# Patient Record
Sex: FEMALE | Race: OTHER | Employment: OTHER | URBAN - NONMETROPOLITAN AREA
[De-identification: names, ages, dates, MRNs, and addresses within clinical notes are randomized per-mention and may not be internally consistent; named-entity substitution may affect disease eponyms.]

---

## 2018-10-19 ENCOUNTER — HOSPITAL ENCOUNTER (EMERGENCY)
Age: 48
Discharge: HOME | End: 2018-10-19
Attending: EMERGENCY MEDICINE
Payer: MEDICARE

## 2018-10-19 VITALS
DIASTOLIC BLOOD PRESSURE: 82 MMHG | TEMPERATURE: 98.8 F | OXYGEN SATURATION: 97 % | RESPIRATION RATE: 20 BRPM | HEART RATE: 99 BPM | BODY MASS INDEX: 19.04 KG/M2 | SYSTOLIC BLOOD PRESSURE: 137 MMHG | WEIGHT: 97 LBS | HEIGHT: 60 IN

## 2018-10-19 DIAGNOSIS — N30.00 ACUTE CYSTITIS WITHOUT HEMATURIA: Primary | ICD-10-CM

## 2018-10-19 DIAGNOSIS — J45.909 ASTHMA: ICD-10-CM

## 2018-10-19 LAB
BACTERIA: ABNORMAL /HPF
BILIRUBIN URINE: NEGATIVE
BLOOD, URINE: ABNORMAL
CLARITY: ABNORMAL
COLOR: YELLOW
EPITHELIAL CELLS, UA: ABNORMAL /HPF
GLUCOSE URINE: NEGATIVE MG/DL
HCG(URINE) PREGNANCY TEST: NEGATIVE
KETONES, URINE: NEGATIVE MG/DL
LEUKOCYTE ESTERASE, URINE: ABNORMAL
MICROSCOPIC EXAMINATION: YES
NITRITE, URINE: POSITIVE
PH UA: 7.5
PROTEIN UA: 100 MG/DL
RBC UA: ABNORMAL /HPF (ref 0–2)
SPECIFIC GRAVITY UA: 1.02
URINE REFLEX TO CULTURE: YES
URINE TYPE: ABNORMAL
UROBILINOGEN, URINE: 0.2 E.U./DL
WBC UA: >100 /HPF (ref 0–5)

## 2018-10-19 PROCEDURE — 87077 CULTURE AEROBIC IDENTIFY: CPT

## 2018-10-19 PROCEDURE — 87186 SC STD MICRODIL/AGAR DIL: CPT

## 2018-10-19 PROCEDURE — 81001 URINALYSIS AUTO W/SCOPE: CPT

## 2018-10-19 PROCEDURE — 84703 CHORIONIC GONADOTROPIN ASSAY: CPT

## 2018-10-19 PROCEDURE — 99284 EMERGENCY DEPT VISIT MOD MDM: CPT

## 2018-10-19 PROCEDURE — 6370000000 HC RX 637 (ALT 250 FOR IP): Performed by: EMERGENCY MEDICINE

## 2018-10-19 PROCEDURE — 87086 URINE CULTURE/COLONY COUNT: CPT

## 2018-10-19 RX ORDER — CIPROFLOXACIN 500 MG/1
500 TABLET, FILM COATED ORAL ONCE
Status: CANCELLED | OUTPATIENT
Start: 2018-10-19 | End: 2018-10-19

## 2018-10-19 RX ORDER — ONDANSETRON 4 MG/1
4 TABLET, ORALLY DISINTEGRATING ORAL EVERY 8 HOURS PRN
Qty: 10 TABLET | Refills: 0 | Status: ON HOLD | OUTPATIENT
Start: 2018-10-19 | End: 2019-06-23

## 2018-10-19 RX ORDER — CIPROFLOXACIN 500 MG/1
500 TABLET, FILM COATED ORAL ONCE
Status: COMPLETED | OUTPATIENT
Start: 2018-10-19 | End: 2018-10-19

## 2018-10-19 RX ORDER — CIPROFLOXACIN 500 MG/1
500 TABLET, FILM COATED ORAL 2 TIMES DAILY
Qty: 20 TABLET | Refills: 0 | Status: SHIPPED | OUTPATIENT
Start: 2018-10-19 | End: 2018-10-29

## 2018-10-19 RX ORDER — ALBUTEROL SULFATE 90 UG/1
2 AEROSOL, METERED RESPIRATORY (INHALATION) EVERY 6 HOURS PRN
Qty: 1 INHALER | Refills: 0 | Status: SHIPPED | OUTPATIENT
Start: 2018-10-19 | End: 2018-10-19

## 2018-10-19 RX ADMIN — CIPROFLOXACIN 500 MG: 500 TABLET, FILM COATED ORAL at 19:22

## 2018-10-19 ASSESSMENT — PAIN DESCRIPTION - PROGRESSION: CLINICAL_PROGRESSION: GRADUALLY WORSENING

## 2018-10-19 ASSESSMENT — PAIN DESCRIPTION - LOCATION: LOCATION: BACK

## 2018-10-19 ASSESSMENT — PAIN DESCRIPTION - PAIN TYPE: TYPE: ACUTE PAIN

## 2018-10-19 ASSESSMENT — PAIN DESCRIPTION - FREQUENCY: FREQUENCY: CONTINUOUS

## 2018-10-19 ASSESSMENT — PAIN DESCRIPTION - DESCRIPTORS: DESCRIPTORS: BURNING

## 2018-10-19 ASSESSMENT — PAIN SCALES - GENERAL: PAINLEVEL_OUTOF10: 9

## 2018-10-19 ASSESSMENT — PAIN DESCRIPTION - ONSET: ONSET: SUDDEN

## 2018-10-19 ASSESSMENT — PAIN DESCRIPTION - ORIENTATION: ORIENTATION: RIGHT;LOWER

## 2018-10-22 LAB
ORGANISM: ABNORMAL
URINE CULTURE, ROUTINE: ABNORMAL
URINE CULTURE, ROUTINE: ABNORMAL

## 2019-01-17 ENCOUNTER — HOSPITAL ENCOUNTER (EMERGENCY)
Age: 49
Discharge: HOME | End: 2019-01-17
Attending: EMERGENCY MEDICINE
Payer: MEDICARE

## 2019-01-17 ENCOUNTER — APPOINTMENT (OUTPATIENT)
Dept: CT IMAGING | Age: 49
End: 2019-01-17
Payer: MEDICARE

## 2019-01-17 VITALS
HEIGHT: 60 IN | BODY MASS INDEX: 21.6 KG/M2 | DIASTOLIC BLOOD PRESSURE: 86 MMHG | HEART RATE: 95 BPM | TEMPERATURE: 99 F | OXYGEN SATURATION: 98 % | SYSTOLIC BLOOD PRESSURE: 133 MMHG | RESPIRATION RATE: 16 BRPM | WEIGHT: 110 LBS

## 2019-01-17 DIAGNOSIS — S09.90XA CLOSED HEAD INJURY, INITIAL ENCOUNTER: Primary | ICD-10-CM

## 2019-01-17 PROCEDURE — 6370000000 HC RX 637 (ALT 250 FOR IP): Performed by: EMERGENCY MEDICINE

## 2019-01-17 PROCEDURE — 70450 CT HEAD/BRAIN W/O DYE: CPT

## 2019-01-17 PROCEDURE — 99283 EMERGENCY DEPT VISIT LOW MDM: CPT

## 2019-01-17 RX ORDER — IBUPROFEN 600 MG/1
600 TABLET ORAL ONCE
Status: COMPLETED | OUTPATIENT
Start: 2019-01-17 | End: 2019-01-17

## 2019-01-17 RX ORDER — OMEPRAZOLE 20 MG/1
20 CAPSULE, DELAYED RELEASE ORAL DAILY
COMMUNITY
Start: 2018-04-20 | End: 2021-11-16

## 2019-01-17 RX ORDER — FLUTICASONE PROPIONATE 50 MCG
SPRAY, SUSPENSION (ML) NASAL DAILY PRN
COMMUNITY
Start: 2018-10-19

## 2019-01-17 RX ORDER — IBUPROFEN 600 MG/1
600 TABLET ORAL EVERY 6 HOURS PRN
Qty: 20 TABLET | Refills: 0 | Status: SHIPPED | OUTPATIENT
Start: 2019-01-17 | End: 2019-05-03

## 2019-01-17 RX ADMIN — IBUPROFEN 600 MG: 600 TABLET ORAL at 12:21

## 2019-01-17 ASSESSMENT — PAIN SCALES - GENERAL
PAINLEVEL_OUTOF10: 9
PAINLEVEL_OUTOF10: 5

## 2019-01-17 ASSESSMENT — PAIN DESCRIPTION - LOCATION: LOCATION: HEAD

## 2019-01-17 ASSESSMENT — PAIN DESCRIPTION - FREQUENCY: FREQUENCY: OTHER (COMMENT)

## 2019-01-17 ASSESSMENT — PAIN DESCRIPTION - PAIN TYPE: TYPE: ACUTE PAIN

## 2019-05-03 ENCOUNTER — HOSPITAL ENCOUNTER (EMERGENCY)
Age: 49
Discharge: HOME | End: 2019-05-03
Attending: EMERGENCY MEDICINE
Payer: MEDICARE

## 2019-05-03 ENCOUNTER — APPOINTMENT (OUTPATIENT)
Dept: GENERAL RADIOLOGY | Age: 49
End: 2019-05-03
Payer: MEDICARE

## 2019-05-03 VITALS
TEMPERATURE: 99.5 F | HEART RATE: 97 BPM | SYSTOLIC BLOOD PRESSURE: 122 MMHG | WEIGHT: 97 LBS | OXYGEN SATURATION: 96 % | RESPIRATION RATE: 16 BRPM | BODY MASS INDEX: 19.04 KG/M2 | HEIGHT: 60 IN | DIASTOLIC BLOOD PRESSURE: 84 MMHG

## 2019-05-03 DIAGNOSIS — S82.401A CLOSED FRACTURE OF RIGHT TIBIA AND FIBULA, INITIAL ENCOUNTER: Primary | ICD-10-CM

## 2019-05-03 DIAGNOSIS — S82.201A CLOSED FRACTURE OF RIGHT TIBIA AND FIBULA, INITIAL ENCOUNTER: Primary | ICD-10-CM

## 2019-05-03 PROCEDURE — 99283 EMERGENCY DEPT VISIT LOW MDM: CPT

## 2019-05-03 PROCEDURE — 73600 X-RAY EXAM OF ANKLE: CPT

## 2019-05-03 PROCEDURE — 4500000023 HC ED LEVEL 3 PROCEDURE

## 2019-05-03 PROCEDURE — 6370000000 HC RX 637 (ALT 250 FOR IP): Performed by: EMERGENCY MEDICINE

## 2019-05-03 RX ORDER — OXYCODONE HYDROCHLORIDE AND ACETAMINOPHEN 5; 325 MG/1; MG/1
1 TABLET ORAL ONCE
Status: COMPLETED | OUTPATIENT
Start: 2019-05-03 | End: 2019-05-03

## 2019-05-03 RX ORDER — OXYCODONE HYDROCHLORIDE AND ACETAMINOPHEN 5; 325 MG/1; MG/1
1 TABLET ORAL EVERY 6 HOURS PRN
Qty: 12 TABLET | Refills: 0 | Status: SHIPPED | OUTPATIENT
Start: 2019-05-03 | End: 2019-05-06

## 2019-05-03 RX ADMIN — OXYCODONE AND ACETAMINOPHEN 1 TABLET: 5; 325 TABLET ORAL at 22:42

## 2019-05-03 ASSESSMENT — PAIN DESCRIPTION - FREQUENCY: FREQUENCY: CONTINUOUS

## 2019-05-03 ASSESSMENT — PAIN DESCRIPTION - ORIENTATION: ORIENTATION: RIGHT

## 2019-05-03 ASSESSMENT — PAIN SCALES - GENERAL: PAINLEVEL_OUTOF10: 10

## 2019-05-03 ASSESSMENT — PAIN DESCRIPTION - LOCATION: LOCATION: ANKLE

## 2019-05-03 ASSESSMENT — PAIN DESCRIPTION - PAIN TYPE: TYPE: ACUTE PAIN

## 2019-05-03 ASSESSMENT — PAIN DESCRIPTION - DESCRIPTORS: DESCRIPTORS: DULL;CONSTANT

## 2019-05-04 NOTE — ED NOTES
Pt DC home in good condition with RX x 1. V/u of Dc instructions. Denies questions or concerns. Teaching done re: s/s to report.      Holley Mccray RN  05/03/19 2446

## 2019-05-04 NOTE — ED PROVIDER NOTES
Emergency Department Attending Note    Alia Kimbrough MD    Date of ED VIsit: 5/3/2019    CHIEF COMPLAINT  Ankle Pain (pt states she has hx of dizzy spells and falls. Happened again earlier today and hit rt ankle on something hard this time, is painful and swelling, States dizziness and weakness is from anemia. States has an order to see another gi dr. to have stomach reevaluated.)      HISTORY OF PRESENT ILLNESS  Marcelo Antunez is a 54 y.o. female  With Vital signs of /84   Pulse 97   Temp 99.5 °F (37.5 °C) (Oral)   Resp 16   Ht 5' (1.524 m)   Wt 97 lb (44 kg)   SpO2 96%   BMI 18.94 kg/m²  who presents to the ED with a complaint of right ankle pain and swelling. . Patient seen and evaluated in room 8. Patient states she was walking in her bedroom when she had a little lightheaded and apparently hit her right ankle on the lateral aspect on a firm's piece of furniture causing exquisite pain in the lateral aspect of the right ankle. She did not syncopized she was able to sit down but it got swollen pretty quickly so she opted to come to the emergency for evaluation. She has no other sites of injury from the fall. She does have a chronic pain issue with arthritis as the lead diagnosis. She is able to wiggle her toes. No other complaints, modifying factors or associated symptoms. I have reviewed the following from the nursing documentation. Past Medical History:   Diagnosis Date    ADHD     Allergic rhinitis     Anemia     Anxiety     Arthritis     Asthma     Bipolar 1 disorder (HCC)     Chicken pox     as a child    COPD (chronic obstructive pulmonary disease) ([facility] [address]     Depression     GERD (gastroesophageal reflux disease)     PTSD (post-traumatic stress disorder)      Past Surgical History:   Procedure Laterality Date    APPENDECTOMY       SECTION      TONSILLECTOMY       No family history on file.   Social History     Socioeconomic History    Marital status: Legally      Spouse name: Not on file    Number of children: Not on file    Years of education: Not on file    Highest education level: Not on file   Occupational History    Not on file   Social Needs    Financial resource strain: Not on file    Food insecurity:     Worry: Not on file     Inability: Not on file    Transportation needs:     Medical: Not on file     Non-medical: Not on file   Tobacco Use    Smoking status: Never Smoker    Smokeless tobacco: Former User     Types: Chew   Substance and Sexual Activity    Alcohol use: Yes     Comment: occ    Drug use: Yes     Types: Marijuana     Comment: occ    Sexual activity: Yes     Partners: Male   Lifestyle    Physical activity:     Days per week: Not on file     Minutes per session: Not on file    Stress: Not on file   Relationships    Social connections:     Talks on phone: Not on file     Gets together: Not on file     Attends Judaism service: Not on file     Active member of club or organization: Not on file     Attends meetings of clubs or organizations: Not on file     Relationship status: Not on file    Intimate partner violence:     Fear of current or ex partner: Not on file     Emotionally abused: Not on file     Physically abused: Not on file     Forced sexual activity: Not on file   Other Topics Concern    Not on file   Social History Narrative    Not on file     Current Facility-Administered Medications   Medication Dose Route Frequency Provider Last Rate Last Dose    oxyCODONE-acetaminophen (PERCOCET) 5-325 MG per tablet 1 tablet  1 tablet Oral Once Ana Ochoa MD         Current Outpatient Medications   Medication Sig Dispense Refill    omeprazole (PRILOSEC) 20 MG delayed release capsule Take 20 mg by mouth daily      fluticasone (FLONASE) 50 MCG/ACT nasal spray       Meclizine HCl (ANTIVERT PO) Take 12.5 mg by mouth 3 times daily as needed      ondansetron (ZOFRAN ODT) 4 MG disintegrating tablet Take 1 tablet by mouth every 8 hours as needed for Nausea or Vomiting (You feel nauseous) 10 tablet 0    celecoxib (CELEBREX) 100 MG capsule Take 100 mg by mouth 2 times daily Unsure of dose      traMADol (ULTRAM) 50 MG tablet Take 100 mg by mouth 3 times daily      cyclobenzaprine (FLEXERIL) 10 MG tablet Take 10 mg by mouth 3 times daily as needed for Muscle spasms      calcium carbonate (OSCAL) 500 MG TABS tablet Take 1,000 mg by mouth daily      albuterol (PROVENTIL HFA;VENTOLIN HFA) 108 (90 BASE) MCG/ACT inhaler Inhale 2 puffs into the lungs every 6 hours as needed for Wheezing      docusate sodium (COLACE) 100 MG capsule Take 100 mg by mouth 2 times daily      Ferrous Sulfate (IRON) 325 (65 FE) MG TABS Take 325 mg by mouth 2 times daily. 60 tablet 2    VITAMIN D PO Take  by mouth.  Polyethylene Glycol 3350 (MIRALAX PO) Take  by mouth. No Known Allergies    REVIEW OF SYSTEMS  10 systems reviewed, pertinent positives per HPI otherwise noted to be negative     PHYSICAL EXAM  /84   Pulse 97   Temp 99.5 °F (37.5 °C) (Oral)   Resp 16   Ht 5' (1.524 m)   Wt 97 lb (44 kg)   SpO2 96%   BMI 18.94 kg/m²   GENERAL APPEARANCE: Awake and alert. Cooperative. In mild to moderate distress. HEAD: Normocephalic. Atraumatic. EYES: PERRL. EOM's grossly intact. ENT: Mucous membranes are pink and moist.   NECK: Supple. HEART: RRR. No murmurs. LUNGS: Respirations unlabored. CTAB. Good air exchange. ABDOMEN: Soft. Non-distended. Non-tender. No masses. No organomegaly. No guarding or rebound. EXTREMITIES: No peripheral edema. Moves all extremities equally. All extremities neurovascularly intact. There is obvious swelling over the lateral malleolus of the right foot ankle  SKIN: Warm and dry. No acute rashes. NEUROLOGICAL: Alert and oriented. Is able to wiggle her toes on the right foot. Strength 5/5, sensation intact. Gait normal.   PSYCHIATRIC: Normal mood and affect.   No HI or SI expressed to me.    RADIOLOGY    See below     EKG:     See below      ED COURSE/MDM        ED Course as of May 03 2250   Fri May 03, 2019   2249 4 patient has a compounded fractured distal fibula only. Tibia is in place without fracture. XR ANKLE RIGHT (2 VIEWS) [DL]   2206 She will be placed in a splint as well as given crutches for walking to follow-up with an orthopedic surgeon    [DL]   2249 Pain medicine and enough to get her through until Monday until she can talk to her primary management clinic. [DL]      ED Course User Index  [DL] Lewis Hazel MD       Radiographs show a fibular distal fibular fracture. No tibial fractures noted. She'll be placed in a splint given some crutches to help ambulate and follow-up with an orthopedic surgeon for definitive management. She'll be given only 12 Percocet to get her through until Monday when she can call her primary pain management clinic for additional pain management. Old records were reviewed when applicable.  The ED course and plan were reviewed and results discussed with the patient    CLINICAL IMPRESSION and DISPOSITION  Maria A Wu was stable and diagnosed with ankle fracture    Patient was treated with  Percocet splint and crutches         Lewis Hazel MD  05/03/19 4500

## 2019-05-07 ENCOUNTER — OFFICE VISIT (OUTPATIENT)
Dept: ORTHOPEDIC SURGERY | Age: 49
End: 2019-05-07
Payer: MEDICARE

## 2019-05-07 VITALS
SYSTOLIC BLOOD PRESSURE: 132 MMHG | HEIGHT: 60 IN | BODY MASS INDEX: 19.04 KG/M2 | HEART RATE: 74 BPM | WEIGHT: 97 LBS | DIASTOLIC BLOOD PRESSURE: 80 MMHG

## 2019-05-07 DIAGNOSIS — S82.831A CLOSED FRACTURE OF DISTAL END OF RIGHT FIBULA, UNSPECIFIED FRACTURE MORPHOLOGY, INITIAL ENCOUNTER: Primary | ICD-10-CM

## 2019-05-07 PROCEDURE — 99203 OFFICE O/P NEW LOW 30 MIN: CPT | Performed by: ORTHOPAEDIC SURGERY

## 2019-05-07 PROCEDURE — G8420 CALC BMI NORM PARAMETERS: HCPCS | Performed by: ORTHOPAEDIC SURGERY

## 2019-05-07 PROCEDURE — G8427 DOCREV CUR MEDS BY ELIG CLIN: HCPCS | Performed by: ORTHOPAEDIC SURGERY

## 2019-05-07 PROCEDURE — 1036F TOBACCO NON-USER: CPT | Performed by: ORTHOPAEDIC SURGERY

## 2019-05-07 RX ORDER — CEPHALEXIN 500 MG/1
500 CAPSULE ORAL 4 TIMES DAILY
Qty: 8 CAPSULE | Refills: 0 | Status: SHIPPED | OUTPATIENT
Start: 2019-05-07 | End: 2019-05-09

## 2019-05-07 RX ORDER — OXYCODONE HYDROCHLORIDE AND ACETAMINOPHEN 5; 325 MG/1; MG/1
1 TABLET ORAL EVERY 8 HOURS PRN
Qty: 21 TABLET | Refills: 0 | Status: SHIPPED | OUTPATIENT
Start: 2019-05-10 | End: 2019-05-16

## 2019-05-07 NOTE — LETTER
Surgery Scheduling Form for [facility]   FAX# [phone]    Adrianna Echevarria MD    Surgical Procedure: ORIF RIGHT FIBULA FRACTURE / [id number]    Scheduled Date: 19     Scheduled Time: 12:15 PM    Length of Surgery: 60 minutes    Patient Information:   Name: Anni Alva    YOB: 1970 Gender: female    SSN:     Address: [address] [place] [address]     Phone number: [phone] (home)     Primary Care Physician: Zulay Cortez PA-C    Classification:  [x]Same Day   [] Admit Same Day  [] Already Inpatient    Pre- Op Diagnosis:  RIGHT FIBULA FRACTURE - S82.831A    Anesthesia Type:   [x]General []MAC    []IV Regional []Local [x]Other: BLOCK               SCD:  [x]Knee High []Thigh High    Allergies: No Known Allergies  Latex: []YES          [x]NO    []C-ARM needed [x]Special Equipment: Roberto Locust      []Rep Notified      [x]   Mini C-ARM  []  None Needed         Insurance Information: MEDICARE  [x]Card in Media in EPIC Chart    []Card Faxed    Special Requests:     Remarks/Comments:  PCP TO DO THE H & P    Scheduled By: Justin Dexter 2019                          [facility]    IN ACCORDANCE WITH OUR FORMULARY SYSTEM, A GENERIC EQUIVALENT DRUG MAY BE DISPENSED AND ADMINISTERED UNLESS D. A. W. IS WRITTEN WITH THE MEDICATION ORDER  PRE-SURGICAL PHYSICIAN ORDERS  ORTHOPEDIC SURGERY    Patient Name Anni Alva                              1970     Surgical Procedure ORIF RIGHT FIBULA FRACTURE / [id number]    Date of Surgery 19     []IAdmit as Inpatient    [x]I Outpatient  [] Already Inpatient    Height 4 FT 11 IN     Weight 97 LBS    Allergies  No Known Allergies  MRSA positive or history:     Pre-surgery Testing Orders:   [x]I Pre-surgical Anesthesia Orders Per Anesthesiologist  Additional Testing:    []IU/A               []I Urine C/S                             []I CBC w Diff                             []I Type & Screen                             []I PTINR []I PTT           []I Transferrin         []I Albumin    []I BMP   []I Other  []I CXR (medical reason)        Preop Antibiotic Prophylaxis / DAY OF SURGERY 19     [x]I Cefazolin IVPB per weight base protocol  ? Cefazolin 2 grams if <119.9 kg  ? Cefazolin 3 grams if ?120kg (?264 lbs. )  ? Pediatric(<14yo) Dosinmg/kg (maximum 2 grams)   If allergic to PCN    []I  Clindamycin 900 mg IVPB   ___________________________________________  If allergic to PCN/Cephalosporin, positive MRSA/history or ? 72  age (risk of C-difficile):   []I  Vancomycin IVPB per weight base protocol  ? Vancomycin 1 gm if < 80kg (<176 lbs. )  ? Vancomycin 1.5 gm if ?80kg to <120kg (?176 to <264 lbs. )  ? Vancomycin 2 gm if  if ?120kg (?264 lbs.)   ADDITIONAL MEDICATIONS:      []I OFIRMEV IVPB 1gm over 15 minutes (Adjust to body weight when needed.    DVT PREVENTION:     [x]I  Knee high Antithrombic wraps (Age 16 & older)        []I  Bilateral             []I   Right                 [x]I Left   []I  Thigh MARLEN Hose          Signature                                  Olga Townsend MD           Date   19    4:24 PM  Please fax at time of booking the case to the Scheduling office at  [facility] at [phone]                                                  Updated 2015

## 2019-05-08 ENCOUNTER — TELEPHONE (OUTPATIENT)
Dept: ORTHOPEDIC SURGERY | Age: 49
End: 2019-05-08

## 2019-05-16 ENCOUNTER — HOSPITAL ENCOUNTER (EMERGENCY)
Age: 49
Discharge: HOME | End: 2019-05-16
Payer: MEDICARE

## 2019-05-16 ENCOUNTER — APPOINTMENT (OUTPATIENT)
Dept: GENERAL RADIOLOGY | Age: 49
End: 2019-05-16
Payer: MEDICARE

## 2019-05-16 VITALS
HEART RATE: 94 BPM | TEMPERATURE: 98.8 F | WEIGHT: 97 LBS | DIASTOLIC BLOOD PRESSURE: 64 MMHG | SYSTOLIC BLOOD PRESSURE: 125 MMHG | BODY MASS INDEX: 19.04 KG/M2 | HEIGHT: 60 IN | RESPIRATION RATE: 16 BRPM | OXYGEN SATURATION: 99 %

## 2019-05-16 DIAGNOSIS — S82.401D: Primary | ICD-10-CM

## 2019-05-16 PROCEDURE — 99283 EMERGENCY DEPT VISIT LOW MDM: CPT

## 2019-05-16 PROCEDURE — 73610 X-RAY EXAM OF ANKLE: CPT

## 2019-05-16 PROCEDURE — 73590 X-RAY EXAM OF LOWER LEG: CPT

## 2019-05-16 RX ORDER — OXYCODONE HYDROCHLORIDE AND ACETAMINOPHEN 5; 325 MG/1; MG/1
1 TABLET ORAL EVERY 6 HOURS PRN
Qty: 10 TABLET | Refills: 0 | Status: SHIPPED | OUTPATIENT
Start: 2019-05-16 | End: 2019-05-19

## 2019-05-16 ASSESSMENT — PAIN DESCRIPTION - PAIN TYPE: TYPE: ACUTE PAIN

## 2019-05-16 ASSESSMENT — PAIN SCALES - GENERAL: PAINLEVEL_OUTOF10: 9

## 2019-05-16 ASSESSMENT — PAIN DESCRIPTION - ORIENTATION: ORIENTATION: RIGHT

## 2019-05-16 ASSESSMENT — PAIN DESCRIPTION - LOCATION: LOCATION: LEG

## 2019-05-16 NOTE — ED PROVIDER NOTES
Evaluated by BIANCA supervising physician available for consult    Patient fell and fractured distal fibula 5/3 seen in ER, then saw orthopedics  and plan for surgery  but patient didn't have a ride and rescheduled for . She is wearing a cast and using crutches but states she has slipped a few times and hit her foot on the ground and thinks she has worsened the fracture and came in requesting another xray and more pain medication. The history is provided by the patient. Ankle Problem   Location:  Ankle  Time since incident:  1 week  Injury: yes    Ankle location:  R ankle  Pain details:     Progression:  Worsening  Chronicity:  New  Associated symptoms: swelling    Associated symptoms: no fever        Review of Systems   Constitutional: Negative for fever. Musculoskeletal:        Right ankle pain   Skin: Negative for wound. Neurological: Negative for numbness. PAST MEDICAL HISTORY   has a past medical history of ADHD, Allergic rhinitis, Anemia, Anxiety, Arthritis, Asthma, Bipolar 1 disorder ([address] Chicken pox, COPD (chronic obstructive pulmonary disease) ([facility] [address] Depression, GERD (gastroesophageal reflux disease), and PTSD (post-traumatic stress disorder). PAST SURGICAL HISTORY   has a past surgical history that includes Appendectomy; Tonsillectomy; and  section. FAMILY HISTORY  family history is not on file. She was adopted. SOCIAL HISTORY   reports that she has never smoked. She has quit using smokeless tobacco. Her smokeless tobacco use included chew. She reports that she drank alcohol. She reports that she has current or past drug history. Drug: Marijuana. HOME MEDICATIONS     Prior to Admission medications    Medication Sig Start Date End Date Taking? Authorizing Provider   oxyCODONE-acetaminophen (PERCOCET) 5-325 MG per tablet Take 1 tablet by mouth every 8 hours as needed for Pain for up to 7 days.  5/10/19 5/17/19 Yes Tay Gramajo MD   omeprazole (PRILOSEC) 20 MG delayed release capsule Take 20 mg by mouth daily 4/20/18  Yes Historical Provider, MD   fluticasone (FLONASE) 50 MCG/ACT nasal spray by Nasal route daily as needed  10/19/18  Yes Historical Provider, MD   Meclizine HCl (ANTIVERT PO) Take 12.5 mg by mouth 3 times daily as needed 2/28/14  Yes Historical Provider, MD   ondansetron (ZOFRAN ODT) 4 MG disintegrating tablet Take 1 tablet by mouth every 8 hours as needed for Nausea or Vomiting (You feel nauseous) 10/19/18  Yes Eze Mix MD   celecoxib (CELEBREX) 100 MG capsule Take 100 mg by mouth 2 times daily Unsure of dose   Yes Historical Provider, MD   cyclobenzaprine (FLEXERIL) 10 MG tablet Take 10 mg by mouth 3 times daily as needed for Muscle spasms   Yes Historical Provider, MD   calcium carbonate (OSCAL) 500 MG TABS tablet Take 1,000 mg by mouth daily   Yes Historical Provider, MD   albuterol (PROVENTIL HFA;VENTOLIN HFA) 108 (90 BASE) MCG/ACT inhaler Inhale 2 puffs into the lungs every 6 hours as needed for Wheezing   Yes Historical Provider, MD   docusate sodium (COLACE) 100 MG capsule Take 100 mg by mouth 2 times daily as needed    Yes Historical Provider, MD   Ferrous Sulfate (IRON) 325 (65 FE) MG TABS Take 325 mg by mouth 2 times daily. 2/28/11  Yes Christo Puckett MD        ALLERGIES  has No Known Allergies. /64   Pulse 94   Temp 98.8 °F (37.1 °C) (Oral)   Resp 16   Ht 5' (1.524 m)   Wt 97 lb (44 kg)   LMP 05/02/2019   SpO2 99%   BMI 18.94 kg/m²     Physical Exam   Constitutional: She is oriented to person, place, and time. She appears well-developed and well-nourished. Non-toxic appearance. She does not have a sickly appearance. She does not appear ill. HENT:   Head: Normocephalic and atraumatic. Cardiovascular: Intact distal pulses. Pulmonary/Chest: Effort normal. No respiratory distress. Musculoskeletal:   Right ankle in a cast.  Toes are warm and pink. Intact sensation. Brisk capillary refill less than 1 second.   Patient wiggling toes. No tenderness or swelling proximally. Cast is not too tight. Neurological: She is alert and oriented to person, place, and time. No sensory deficit. She exhibits normal muscle tone. Skin: Skin is warm and dry. Psychiatric: She has a normal mood and affect. Her behavior is normal.   Vitals reviewed. Procedures    MDM  Number of Diagnoses or Management Options  Traumatic closed displaced fracture of shaft of fibula, right, with routine healing, subsequent encounter:      Amount and/or Complexity of Data Reviewed  Tests in the radiology section of CPT®: ordered and reviewed  Review and summarize past medical records: yes  Independent visualization of images, tracings, or specimens: yes    Patient Progress  Patient progress: stable    Xr Tibia Fibula Right (2 Views)    Result Date: 5/16/2019  EXAMINATION: 2 XRAY VIEWS OF THE RIGHT TIBIA AND FIBULA 5/16/2019 6:20 pm COMPARISON: None. HISTORY: ORDERING SYSTEM PROVIDED HISTORY: injury r/o proximal fibula fx TECHNOLOGIST PROVIDED HISTORY: Reason for exam:->injury r/o proximal fibula fx Ordering Physician Provided Reason for Exam: fell on rt leg, fx rt ankle few weeks ago Acuity: Acute Type of Exam: Initial FINDINGS: Nondisplaced fracture distal fibula. Tibia intact. Soft tissues unremarkable. Posterior fiberglass splint. Nondisplaced fracture distal fibula     Xr Ankle Right (2 Views)    Result Date: 5/3/2019  EXAMINATION: 3 XRAY VIEWS OF THE RIGHT ANKLE 5/3/2019 10:22 pm COMPARISON: None. HISTORY: ORDERING SYSTEM PROVIDED HISTORY: fall/pain, lateral TECHNOLOGIST PROVIDED HISTORY: Reason for exam:->fall/pain, lateral Ordering Physician Provided Reason for Exam: got dizzy and fell injured rt ankle Acuity: Acute Type of Exam: Initial FINDINGS: There is an acute minimally displaced oblique fracture of the distal fibular metaphysis. No other fractures are identified.   Joint space alignment is normal.  There is significant lateral soft tissue swelling. Acute distal fibula fracture. Xr Ankle Right (min 3 Views)    Result Date: 5/16/2019  EXAMINATION: 3 XRAY VIEWS OF THE RIGHT ANKLE 5/16/2019 5:45 pm COMPARISON: None. HISTORY: ORDERING SYSTEM PROVIDED HISTORY: recent fx, re-injured TECHNOLOGIST PROVIDED HISTORY: Reason for exam:->recent fx, re-injured Ordering Physician Provided Reason for Exam: almost passed out and fell on rt foot that injured a few weeks ago Acuity: Acute Type of Exam: Initial FINDINGS: Oblique fracture distal fibula extends to the joint. Cortical margins are otherwise intact. Posterior fiberglass splint obscures detail. Alignment anatomic. Soft tissues unremarkable. Fracture distal fibula. Recommend imaging of the proximal tibia and fibula as well.          6:54 PM  Repeat x-ray today is unchanged from prior. Patient advised continue her crutches, no weightbearing. Advised to keep her leg elevated. She will be provided a few more pain pills to get her through the weekend and her appointment with orthopedic surgeon is on Monday. Advised returning to the ER for worsening symptoms. She understands and agrees. I estimate there is LOW risk for COMPARTMENT SYNDROME, DEEP VENOUS THROMBOSIS, SEPTIC ARTHRITIS, OR NEUROVASCULAR INJURY, thus I consider the discharge disposition reasonable. I discussed the nature and purpose, risks and benefits, as well as, the alternatives of opiates for pain relief with Clau Silverman. The risks discussed included but were not limited to overdose, addiction, dependence, and tolerance. Clau Silverman was given the time and opportunity to ask questions and consider their options, and after the discussion, Clau Silverman decided to verbally consent to opiates. Prior to consenting, I believe that Clau Silverman has the capacity to make this medical decision. Please note that this chart was generated using Dragon dictation software.  Although every effort was made to ensure the accuracy of this automated transcription, some errors in transcription may have occurred         Yan Connors PA-C  05/16/19 2033

## 2019-05-29 ENCOUNTER — OFFICE VISIT (OUTPATIENT)
Dept: ORTHOPEDIC SURGERY | Age: 49
End: 2019-05-29
Payer: MEDICARE

## 2019-05-29 VITALS
SYSTOLIC BLOOD PRESSURE: 96 MMHG | HEART RATE: 67 BPM | WEIGHT: 97 LBS | BODY MASS INDEX: 19.04 KG/M2 | DIASTOLIC BLOOD PRESSURE: 67 MMHG | HEIGHT: 60 IN

## 2019-05-29 DIAGNOSIS — S82.831A CLOSED FRACTURE OF DISTAL END OF RIGHT FIBULA, UNSPECIFIED FRACTURE MORPHOLOGY, INITIAL ENCOUNTER: Primary | ICD-10-CM

## 2019-05-29 PROCEDURE — 27786 TREATMENT OF ANKLE FRACTURE: CPT | Performed by: ORTHOPAEDIC SURGERY

## 2019-05-29 PROCEDURE — G8427 DOCREV CUR MEDS BY ELIG CLIN: HCPCS | Performed by: ORTHOPAEDIC SURGERY

## 2019-05-29 PROCEDURE — G8420 CALC BMI NORM PARAMETERS: HCPCS | Performed by: ORTHOPAEDIC SURGERY

## 2019-05-29 PROCEDURE — 29405 APPL SHORT LEG CAST: CPT | Performed by: ORTHOPAEDIC SURGERY

## 2019-05-29 PROCEDURE — 99212 OFFICE O/P EST SF 10 MIN: CPT | Performed by: ORTHOPAEDIC SURGERY

## 2019-05-29 PROCEDURE — 1036F TOBACCO NON-USER: CPT | Performed by: ORTHOPAEDIC SURGERY

## 2019-05-29 RX ORDER — OXYCODONE HYDROCHLORIDE AND ACETAMINOPHEN 5; 325 MG/1; MG/1
1 TABLET ORAL EVERY 8 HOURS PRN
Qty: 21 TABLET | Refills: 0 | Status: SHIPPED | OUTPATIENT
Start: 2019-05-29 | End: 2019-06-10 | Stop reason: SDUPTHER

## 2019-05-29 NOTE — PROGRESS NOTES
Subjective: Patient is here for follow-up of follow-up of her right fibula fracture. She is here with her mother. She decided not to have her surgery on the day that it was scheduled. She didn't feel it was necessary. She is here for follow-up now 1 month out from her 5/3/19 date of injury. She states her pain is mild to moderate and she needs more narcotics. Objective: Physical exam shows she has third on the plantar aspect of her right foot splint. She obviously has been walking on this she states she has and she has to at times to be able to feed the dog etc.  She feels her pain is about the same and I told her she needs to stay off the foot or. Minimal swelling in the right ankle. No gross deformity. She is tender to palpation laterally. Imaging: 3 views of the right ankle show no significant change in position there is no callus formation around the fracture site  Assessment and plan: I discussed with her that she needs to be compliant with her weightbearing restriction. We put her into a short leg weightbearing cast out past the toes since I know that she's going to walk on this. I'll see her back in 3 weeks cast should be removed repeat x-rays we'll put her in a boot. I did give her refill of her Percocet one by mouth Q8 when necessary #21.   We discussed the side effects of these medicines and she needs to gradually come off of this

## 2019-06-10 ENCOUNTER — TELEPHONE (OUTPATIENT)
Dept: ORTHOPEDIC SURGERY | Age: 49
End: 2019-06-10

## 2019-06-10 DIAGNOSIS — S82.831A CLOSED FRACTURE OF DISTAL END OF RIGHT FIBULA, UNSPECIFIED FRACTURE MORPHOLOGY, INITIAL ENCOUNTER: ICD-10-CM

## 2019-06-10 RX ORDER — OXYCODONE HYDROCHLORIDE AND ACETAMINOPHEN 5; 325 MG/1; MG/1
1 TABLET ORAL 2 TIMES DAILY PRN
Qty: 14 TABLET | Refills: 0 | Status: SHIPPED | OUTPATIENT
Start: 2019-06-10 | End: 2019-06-17

## 2019-06-19 ENCOUNTER — OFFICE VISIT (OUTPATIENT)
Dept: ORTHOPEDIC SURGERY | Age: 49
End: 2019-06-19
Payer: MEDICARE

## 2019-06-19 VITALS
SYSTOLIC BLOOD PRESSURE: 119 MMHG | HEART RATE: 82 BPM | BODY MASS INDEX: 19.04 KG/M2 | DIASTOLIC BLOOD PRESSURE: 90 MMHG | WEIGHT: 97 LBS | HEIGHT: 60 IN

## 2019-06-19 DIAGNOSIS — S82.831A CLOSED FRACTURE OF DISTAL END OF RIGHT FIBULA, UNSPECIFIED FRACTURE MORPHOLOGY, INITIAL ENCOUNTER: Primary | ICD-10-CM

## 2019-06-19 PROCEDURE — L3260 AMBULATORY SURGICAL BOOT EAC: HCPCS | Performed by: ORTHOPAEDIC SURGERY

## 2019-06-19 PROCEDURE — 99024 POSTOP FOLLOW-UP VISIT: CPT | Performed by: ORTHOPAEDIC SURGERY

## 2019-06-19 PROCEDURE — 29405 APPL SHORT LEG CAST: CPT | Performed by: ORTHOPAEDIC SURGERY

## 2019-06-19 RX ORDER — OXYCODONE HYDROCHLORIDE AND ACETAMINOPHEN 5; 325 MG/1; MG/1
1 TABLET ORAL DAILY
Qty: 7 TABLET | Refills: 0 | Status: SHIPPED | OUTPATIENT
Start: 2019-06-19 | End: 2019-06-26

## 2019-06-19 NOTE — PROGRESS NOTES
Subjective: Patient is here for follow-up of her right fibula fracture date of injury 5/3/2019. We had her set up for surgery and she canceled it. She is now been treated over the past 3 weeks with a short leg nonweightbearing cast and states she still has pain and would like narcotics but it is getting better. She has been doing her exercises and feels like her swelling is going down  Objective: Physical exam shows swelling is mild no erythema no skin disruption she has 10 degrees of dorsiflexion 30 degrees of plantarflexion minimal to no tenderness over the fracture site no evidence of DVT  Imaging: 3 views of the right ankle shows callus formation bridging the distal fibula fracture no widening of the mortise  Assessment and plan: Because of her continued complaint of pain I put her back in a short leg weightbearing cast with a cast shoe I gave her one last prescription for Percocet 1 p.o. daily and she will follow-up with me in 3 weeks cast should be removed repeat x-rays and I will get her into a boot.

## 2019-06-23 ENCOUNTER — APPOINTMENT (OUTPATIENT)
Dept: GENERAL RADIOLOGY | Age: 49
End: 2019-06-23
Payer: MEDICARE

## 2019-06-23 ENCOUNTER — HOSPITAL ENCOUNTER (EMERGENCY)
Age: 49
Discharge: HOME | End: 2019-06-23
Attending: EMERGENCY MEDICINE
Payer: MEDICARE

## 2019-06-23 VITALS
HEART RATE: 84 BPM | OXYGEN SATURATION: 97 % | BODY MASS INDEX: 20.42 KG/M2 | DIASTOLIC BLOOD PRESSURE: 85 MMHG | RESPIRATION RATE: 16 BRPM | SYSTOLIC BLOOD PRESSURE: 127 MMHG | TEMPERATURE: 98.5 F | HEIGHT: 60 IN | WEIGHT: 104 LBS

## 2019-06-23 DIAGNOSIS — J40 BRONCHITIS: Primary | ICD-10-CM

## 2019-06-23 PROCEDURE — 99283 EMERGENCY DEPT VISIT LOW MDM: CPT

## 2019-06-23 PROCEDURE — 71046 X-RAY EXAM CHEST 2 VIEWS: CPT

## 2019-06-23 RX ORDER — CEFDINIR 300 MG/1
300 CAPSULE ORAL 2 TIMES DAILY
Qty: 20 CAPSULE | Refills: 0 | Status: SHIPPED | OUTPATIENT
Start: 2019-06-23 | End: 2019-07-03

## 2019-06-23 RX ORDER — BENZONATATE 100 MG/1
100 CAPSULE ORAL 3 TIMES DAILY PRN
Qty: 15 CAPSULE | Refills: 0 | Status: SHIPPED | OUTPATIENT
Start: 2019-06-23 | End: 2020-09-20 | Stop reason: SDUPTHER

## 2019-06-23 RX ORDER — ALBUTEROL SULFATE 90 UG/1
2 AEROSOL, METERED RESPIRATORY (INHALATION) EVERY 6 HOURS PRN
Qty: 1 INHALER | Refills: 0 | Status: SHIPPED | OUTPATIENT
Start: 2019-06-23

## 2019-06-23 ASSESSMENT — ENCOUNTER SYMPTOMS
COUGH: 1
EYE REDNESS: 0
VOMITING: 0
SHORTNESS OF BREATH: 0
EYE DISCHARGE: 0
DIARRHEA: 0
BACK PAIN: 0
RHINORRHEA: 1
ABDOMINAL PAIN: 0

## 2019-06-23 ASSESSMENT — PAIN DESCRIPTION - DESCRIPTORS: DESCRIPTORS: ACHING

## 2019-06-23 ASSESSMENT — PAIN DESCRIPTION - LOCATION: LOCATION: CHEST

## 2019-06-23 ASSESSMENT — PAIN SCALES - GENERAL: PAINLEVEL_OUTOF10: 4

## 2019-06-23 NOTE — ED PROVIDER NOTES
Pt states she is here for cough and congestion x 2 days. She states she coughs so hard she urinates. States she has \"a weak bladder\" and when she coughs forcefully she has some stress incontinence. She states she is around smokers. She has hx of asthma and COPD. Pt states she has had the cough for 2 days. She states she has a chronic cough but is worsened over the past 2 days. She denies fever but has had chills and body aches. She states the cough is worse with deep breathing. She denies abd pain n/v or diarrhea. She denies CP. The history is provided by the patient. No  was used. URI   Presenting symptoms: congestion, cough and rhinorrhea    Presenting symptoms: no fever    Severity:  Moderate  Onset quality:  Gradual  Timing:  Constant  Progression:  Worsening  Chronicity:  New  Relieved by:  Nothing  Worsened by:  Breathing  Ineffective treatments:  Rest  Associated symptoms: no headaches        Review of Systems   Constitutional: Negative for fever. HENT: Positive for congestion and rhinorrhea. Negative for ear discharge. Eyes: Negative for discharge and redness. Respiratory: Positive for cough. Negative for shortness of breath. Cardiovascular: Negative for chest pain and palpitations. Gastrointestinal: Negative for abdominal pain, diarrhea and vomiting. Genitourinary: Negative for difficulty urinating and flank pain. Musculoskeletal: Negative for back pain and joint swelling. Skin: Negative for rash. Neurological: Negative for syncope and headaches. Psychiatric/Behavioral: Negative for confusion. All other systems reviewed and are negative. PAST MEDICAL HISTORY   has a past medical history of ADHD, Allergic rhinitis, Anemia, Anxiety, Arthritis, Asthma, Bipolar 1 disorder ([address] Chicken pox, COPD (chronic obstructive pulmonary disease) ([facility] [address] Depression, GERD (gastroesophageal reflux disease), and PTSD (post-traumatic stress disorder).     PAST SURGICAL EXAMINATION  ED Triage Vitals [06/23/19 1036]   Enc Vitals Group      /85      Pulse 84      Resp 16      Temp 98.5 °F (36.9 °C)      Temp Source Oral      SpO2 97 %      Weight 104 lb (47.2 kg)      Height 5' (1.524 m)      Head Circumference       Peak Flow       Pain Score       Pain Loc       Pain Edu? Excl. in [address]? Physical Exam   Constitutional: She is oriented to person, place, and time. She appears well-developed. No distress. HENT:   Head: Normocephalic and atraumatic. Right Ear: Tympanic membrane and external ear normal.   Left Ear: Tympanic membrane and external ear normal.   Mouth/Throat: Oropharynx is clear and moist. No oropharyngeal exudate, posterior oropharyngeal edema or posterior oropharyngeal erythema. Eyes: Pupils are equal, round, and reactive to light. Conjunctivae are normal. Right eye exhibits no discharge. Left eye exhibits no discharge. Neck: Normal range of motion. Neck supple. No JVD present. Cardiovascular: Normal rate, regular rhythm and intact distal pulses. Exam reveals no gallop and no friction rub. No murmur heard. Pulmonary/Chest: Effort normal and breath sounds normal. No stridor. No respiratory distress. She has no wheezes. She has no rales. Abdominal: Soft. Bowel sounds are normal. She exhibits no distension and no mass. There is no tenderness. There is no rigidity, no rebound and no guarding. Musculoskeletal: Normal range of motion. She exhibits no edema or deformity. Patient is a cast on her right leg. Neurological: She is alert and oriented to person, place, and time. She has normal strength. No cranial nerve deficit or sensory deficit. She exhibits normal muscle tone. Coordination normal. GCS eye subscore is 4. GCS verbal subscore is 5. GCS motor subscore is 6. Skin: Skin is warm and dry. Capillary refill takes less than 2 seconds. No rash noted. She is not diaphoretic. No erythema. No pallor.    Psychiatric: She has a normal mood and affect. Her behavior is normal.       Procedures    MDM         Radiology    X-ray interpretation by radiologist reviewed (Final interpretation by radiologist to follow): The following radiographic studies: Radiologist's interpretation:    Xr Chest Standard (2 Vw)    Result Date: 6/23/2019  EXAMINATION: TWO XRAY VIEWS OF THE CHEST 6/23/2019 11:03 am COMPARISON: 05/01/2010 HISTORY: ORDERING SYSTEM PROVIDED HISTORY: cough TECHNOLOGIST PROVIDED HISTORY: Reason for exam:->cough Ordering Physician Provided Reason for Exam: cough Acuity: Acute Type of Exam: Initial FINDINGS: Lungs appear grossly clear. Mediastinal silhouette stable. Lateral view reveals no pleural effusion. On frontal projection radiopaque structure projects over the left mid abdomen but is not present on the lateral view, presumably external to the patient. No acute cardiopulmonary abnormality identified. Xr Ankle Right (min 3 Views)    Result Date: 6/19/2019  Radiology exam is complete. No Radiologist dictation. Please follow up with ordering provider. Xr Ankle Right (min 3 Views)    Result Date: 5/29/2019  Radiology exam is complete. No Radiologist dictation. Please follow up with ordering provider. Emergency Department Course:  10:52 AM  Patient gives permission for family/companions to be present during questioning, answers and results during their emergency room visit. PULMONARY EMBOLISM RULE-OUT CRITERIA:    1. Age > 52? No  2. Pulse greater than 99/min? No  3. Room air pulse ox <95%? No  4. Hemoptysis? No  5. On estrogen? No  6. Prior diagnosis of DVT or PE? No  7. Surgery or trauma requiring endotracheal intubation or hospitalization in past 4 wks? No  8. Unilateral leg swelling? No    Discussed at length with both patient and her mother the results. She does have a cast on her leg but has not had any chest pain associated with this has been fever rash according to the patient has had a productive cough. I did discuss with her the option of pursuing a d-dimer and a cardiac type work-up. She understands the implications and benefits of this. She also understands the CAT scan and the benefits and risks of that. At this time after answering all questions and discussing a decision was made to not pursue any further testing at this time. She understands that should she develop any pain redness of breath or any other problems she would need to return to the emergency department. This is felt to be reasonable at this time again patient has no edema to the legs she has no pain in the leg. She has no chest pain she has had fevers at home as productive cough has been going on for 3 days. She again voiced understanding of the patient's of the work-up and after much discussion declines any other work-up at this time and I felt this to be reasonable. Discussed results, diagnosis and plan with patient and/or family. Questions addressed. Disposition and follow-up agreed upon. Specific discharge instructions explained. The patient and/or family and I have discussed the diagnosis and risks, and we agree with discharging home to follow-up with their primary care, specialist or referral doctor. We also discussed returning to the Emergency Department immediately if new or worsening symptoms occur. We have discussed the symptoms which are most concerning that necessitate immediate return. The Clinical Impression is bronchitis      This document serves as a record of the services and decisions personally performed by myself, Alex Maya MD. It was created on my behalf by Eric Warren, 6/23/19   a trained medical scribe. The creation of this document is based on my statements to the medical scribe. This dictation was generated by voice recognition computer software. Although all attempts are made to edit the dictation for accuracy, there may be errors in the transcription that are not intended.

## 2019-06-23 NOTE — ED NOTES
AVS provided and reviewed with the patient and family. The patient and family verbalized understanding of care at home, follow up care, and emergent symptoms to return for. Both verbalized understanding of completing entire medication course as prescribed. No questions or concerns verbalized at this time. The patient is alert, oriented, stable, and ambulatory out of the department at the time of discharge. New crutches provided to the patient due to her current ones being in poor condition with padding missing.        Aj Llanos RN  06/23/19 0600

## 2019-06-23 NOTE — ED NOTES
Patient in room 3, unable to move patient on board. [address] [place] [facility], [facility] to call help desk to move patient.      Beth Jones RN  06/23/19 6332

## 2019-07-19 ENCOUNTER — OFFICE VISIT (OUTPATIENT)
Dept: ORTHOPEDIC SURGERY | Age: 49
End: 2019-07-19
Payer: MEDICARE

## 2019-07-19 VITALS — HEIGHT: 60 IN | BODY MASS INDEX: 20.43 KG/M2 | WEIGHT: 104.06 LBS

## 2019-07-19 DIAGNOSIS — S82.831A CLOSED FRACTURE OF DISTAL END OF RIGHT FIBULA, UNSPECIFIED FRACTURE MORPHOLOGY, INITIAL ENCOUNTER: Primary | ICD-10-CM

## 2019-07-19 PROCEDURE — 99024 POSTOP FOLLOW-UP VISIT: CPT | Performed by: ORTHOPAEDIC SURGERY

## 2019-07-19 PROCEDURE — L4361 PNEUMA/VAC WALK BOOT PRE OTS: HCPCS | Performed by: ORTHOPAEDIC SURGERY

## 2019-08-08 ENCOUNTER — HOSPITAL ENCOUNTER (EMERGENCY)
Age: 49
Discharge: HOME | End: 2019-08-09
Payer: MEDICARE

## 2019-08-08 ENCOUNTER — APPOINTMENT (OUTPATIENT)
Dept: GENERAL RADIOLOGY | Age: 49
End: 2019-08-08
Payer: MEDICARE

## 2019-08-08 DIAGNOSIS — F43.9 STRESS AT HOME: Primary | ICD-10-CM

## 2019-08-08 DIAGNOSIS — F12.10 MARIJUANA ABUSE: ICD-10-CM

## 2019-08-08 LAB
A/G RATIO: 1.6 (ref 1.1–2.2)
ACETAMINOPHEN LEVEL: <5 UG/ML (ref 10–30)
ALBUMIN SERPL-MCNC: 4.7 G/DL (ref 3.4–5)
ALP BLD-CCNC: 51 U/L (ref 40–129)
ALT SERPL-CCNC: 12 U/L (ref 10–40)
AMPHETAMINE SCREEN, URINE: ABNORMAL
ANION GAP SERPL CALCULATED.3IONS-SCNC: 13 MMOL/L (ref 3–16)
AST SERPL-CCNC: 17 U/L (ref 15–37)
BARBITURATE SCREEN URINE: ABNORMAL
BASOPHILS ABSOLUTE: 0.1 K/UL (ref 0–0.2)
BASOPHILS RELATIVE PERCENT: 0.8 %
BENZODIAZEPINE SCREEN, URINE: ABNORMAL
BILIRUB SERPL-MCNC: 0.5 MG/DL (ref 0–1)
BILIRUBIN URINE: NEGATIVE
BLOOD, URINE: NEGATIVE
BUN BLDV-MCNC: 17 MG/DL (ref 7–20)
CALCIUM SERPL-MCNC: 9.5 MG/DL (ref 8.3–10.6)
CANNABINOID SCREEN URINE: POSITIVE
CHLORIDE BLD-SCNC: 105 MMOL/L (ref 99–110)
CLARITY: CLEAR
CO2: 22 MMOL/L (ref 21–32)
COCAINE METABOLITE SCREEN URINE: ABNORMAL
COLOR: YELLOW
CREAT SERPL-MCNC: 0.7 MG/DL (ref 0.6–1.1)
EOSINOPHILS ABSOLUTE: 0.1 K/UL (ref 0–0.6)
EOSINOPHILS RELATIVE PERCENT: 0.8 %
ETHANOL: NORMAL MG/DL (ref 0–0.08)
GFR AFRICAN AMERICAN: >60
GFR NON-AFRICAN AMERICAN: >60
GLOBULIN: 2.9 G/DL
GLUCOSE BLD-MCNC: 98 MG/DL (ref 70–99)
GLUCOSE URINE: NEGATIVE MG/DL
HCG QUALITATIVE: NEGATIVE
HCG(URINE) PREGNANCY TEST: NEGATIVE
HCT VFR BLD CALC: 36.8 % (ref 36–48)
HEMOGLOBIN: 11.8 G/DL (ref 12–16)
KETONES, URINE: NEGATIVE MG/DL
LEUKOCYTE ESTERASE, URINE: NEGATIVE
LYMPHOCYTES ABSOLUTE: 1.4 K/UL (ref 1–5.1)
LYMPHOCYTES RELATIVE PERCENT: 16.3 %
Lab: ABNORMAL
MCH RBC QN AUTO: 26.2 PG (ref 26–34)
MCHC RBC AUTO-ENTMCNC: 32.2 G/DL (ref 31–36)
MCV RBC AUTO: 81.3 FL (ref 80–100)
METHADONE SCREEN, URINE: ABNORMAL
MICROSCOPIC EXAMINATION: NORMAL
MONOCYTES ABSOLUTE: 0.7 K/UL (ref 0–1.3)
MONOCYTES RELATIVE PERCENT: 7.9 %
NEUTROPHILS ABSOLUTE: 6.4 K/UL (ref 1.7–7.7)
NEUTROPHILS RELATIVE PERCENT: 74.2 %
NITRITE, URINE: NEGATIVE
OPIATE SCREEN URINE: ABNORMAL
OXYCODONE URINE: ABNORMAL
PDW BLD-RTO: 19.5 % (ref 12.4–15.4)
PH UA: 7.5
PH UA: 7.5 (ref 5–8)
PHENCYCLIDINE SCREEN URINE: ABNORMAL
PLATELET # BLD: 261 K/UL (ref 135–450)
PMV BLD AUTO: 7.8 FL (ref 5–10.5)
POTASSIUM REFLEX MAGNESIUM: 4 MMOL/L (ref 3.5–5.1)
PROPOXYPHENE SCREEN: ABNORMAL
PROTEIN UA: NEGATIVE MG/DL
RBC # BLD: 4.52 M/UL (ref 4–5.2)
SALICYLATE, SERUM: <0.3 MG/DL (ref 15–30)
SODIUM BLD-SCNC: 140 MMOL/L (ref 136–145)
SPECIFIC GRAVITY UA: 1.01 (ref 1–1.03)
TOTAL PROTEIN: 7.6 G/DL (ref 6.4–8.2)
TROPONIN: <0.01 NG/ML
URINE REFLEX TO CULTURE: NORMAL
URINE TYPE: NORMAL
UROBILINOGEN, URINE: 0.2 E.U./DL
WBC # BLD: 8.6 K/UL (ref 4–11)

## 2019-08-08 PROCEDURE — 99285 EMERGENCY DEPT VISIT HI MDM: CPT

## 2019-08-08 PROCEDURE — G0480 DRUG TEST DEF 1-7 CLASSES: HCPCS

## 2019-08-08 PROCEDURE — 71046 X-RAY EXAM CHEST 2 VIEWS: CPT

## 2019-08-08 PROCEDURE — 80307 DRUG TEST PRSMV CHEM ANLYZR: CPT

## 2019-08-08 PROCEDURE — L4350 ANKLE CONTROL ORTHO PRE OTS: HCPCS

## 2019-08-08 PROCEDURE — 81003 URINALYSIS AUTO W/O SCOPE: CPT

## 2019-08-08 PROCEDURE — 84484 ASSAY OF TROPONIN QUANT: CPT

## 2019-08-08 PROCEDURE — 85025 COMPLETE CBC W/AUTO DIFF WBC: CPT

## 2019-08-08 PROCEDURE — 80053 COMPREHEN METABOLIC PANEL: CPT

## 2019-08-08 PROCEDURE — 93005 ELECTROCARDIOGRAM TRACING: CPT | Performed by: EMERGENCY MEDICINE

## 2019-08-08 PROCEDURE — 84703 CHORIONIC GONADOTROPIN ASSAY: CPT

## 2019-08-08 ASSESSMENT — PAIN DESCRIPTION - LOCATION: LOCATION: CHEST

## 2019-08-08 ASSESSMENT — PAIN SCALES - GENERAL: PAINLEVEL_OUTOF10: 3

## 2019-08-08 ASSESSMENT — PAIN DESCRIPTION - PAIN TYPE: TYPE: ACUTE PAIN

## 2019-08-09 VITALS
SYSTOLIC BLOOD PRESSURE: 106 MMHG | BODY MASS INDEX: 19.83 KG/M2 | HEART RATE: 77 BPM | DIASTOLIC BLOOD PRESSURE: 87 MMHG | HEIGHT: 61 IN | OXYGEN SATURATION: 87 % | TEMPERATURE: 98.3 F | RESPIRATION RATE: 19 BRPM | WEIGHT: 105 LBS

## 2019-08-09 LAB
EKG ATRIAL RATE: 86 BPM
EKG DIAGNOSIS: NORMAL
EKG P AXIS: 72 DEGREES
EKG P-R INTERVAL: 124 MS
EKG Q-T INTERVAL: 342 MS
EKG QRS DURATION: 62 MS
EKG QTC CALCULATION (BAZETT): 409 MS
EKG R AXIS: 52 DEGREES
EKG T AXIS: 63 DEGREES
EKG VENTRICULAR RATE: 86 BPM

## 2019-08-09 PROCEDURE — 93010 ELECTROCARDIOGRAM REPORT: CPT | Performed by: INTERNAL MEDICINE

## 2019-08-09 PROCEDURE — 6370000000 HC RX 637 (ALT 250 FOR IP): Performed by: EMERGENCY MEDICINE

## 2019-08-09 RX ORDER — ACETAMINOPHEN 500 MG
1000 TABLET ORAL ONCE
Status: COMPLETED | OUTPATIENT
Start: 2019-08-09 | End: 2019-08-09

## 2019-08-09 RX ADMIN — ACETAMINOPHEN 1000 MG: 500 TABLET ORAL at 02:10

## 2019-08-09 ASSESSMENT — PAIN SCALES - GENERAL
PAINLEVEL_OUTOF10: 2
PAINLEVEL_OUTOF10: 3

## 2019-08-09 ASSESSMENT — HEART SCORE: ECG: 0
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
## 2019-08-10 ASSESSMENT — ENCOUNTER SYMPTOMS
SHORTNESS OF BREATH: 0
ABDOMINAL PAIN: 0

## 2019-08-30 ENCOUNTER — HOSPITAL ENCOUNTER (EMERGENCY)
Age: 49
Discharge: HOME | End: 2019-08-30
Attending: EMERGENCY MEDICINE
Payer: MEDICARE

## 2019-08-30 VITALS
WEIGHT: 105 LBS | OXYGEN SATURATION: 96 % | BODY MASS INDEX: 20.62 KG/M2 | RESPIRATION RATE: 20 BRPM | DIASTOLIC BLOOD PRESSURE: 72 MMHG | TEMPERATURE: 98.4 F | HEART RATE: 88 BPM | SYSTOLIC BLOOD PRESSURE: 125 MMHG | HEIGHT: 60 IN

## 2019-08-30 DIAGNOSIS — R19.7 DIARRHEA OF PRESUMED INFECTIOUS ORIGIN: Primary | ICD-10-CM

## 2019-08-30 LAB
EKG ATRIAL RATE: 99 BPM
EKG DIAGNOSIS: NORMAL
EKG P AXIS: 85 DEGREES
EKG P-R INTERVAL: 114 MS
EKG Q-T INTERVAL: 328 MS
EKG QRS DURATION: 66 MS
EKG QTC CALCULATION (BAZETT): 420 MS
EKG R AXIS: 76 DEGREES
EKG T AXIS: 62 DEGREES
EKG VENTRICULAR RATE: 99 BPM

## 2019-08-30 PROCEDURE — 99283 EMERGENCY DEPT VISIT LOW MDM: CPT

## 2019-08-30 PROCEDURE — 93010 ELECTROCARDIOGRAM REPORT: CPT | Performed by: INTERNAL MEDICINE

## 2019-08-30 PROCEDURE — 93005 ELECTROCARDIOGRAM TRACING: CPT | Performed by: EMERGENCY MEDICINE

## 2019-08-30 ASSESSMENT — ENCOUNTER SYMPTOMS
RECTAL PAIN: 0
SHORTNESS OF BREATH: 0
VOICE CHANGE: 0
RHINORRHEA: 1
DIARRHEA: 1
WHEEZING: 0
ABDOMINAL PAIN: 0
TROUBLE SWALLOWING: 0
VOMITING: 0
EYE REDNESS: 0
BACK PAIN: 0
NAUSEA: 0
PHOTOPHOBIA: 0
CONSTIPATION: 0

## 2019-08-30 NOTE — ED PROVIDER NOTES
(36.9 °C) Oral 88 20 96 % 5' (1.524 m) 105 lb (47.6 kg)       Physical Exam   Constitutional: She is oriented to person, place, and time. She appears well-developed and well-nourished. No distress. HENT:   Head: Normocephalic. Right Ear: External ear normal.   Left Ear: External ear normal.   Eyes: Pupils are equal, round, and reactive to light. Conjunctivae and EOM are normal.   Neck: Normal range of motion. Neck supple. No thyromegaly present. Cardiovascular: Normal rate, regular rhythm, normal heart sounds and intact distal pulses. Exam reveals no gallop and no friction rub. No murmur heard. Pulmonary/Chest: Effort normal and breath sounds normal. No respiratory distress. Abdominal: Soft. Bowel sounds are normal. She exhibits no distension. There is no tenderness. Neurological: She is alert and oriented to person, place, and time. She displays normal reflexes. No cranial nerve deficit or sensory deficit. She exhibits normal muscle tone. Coordination normal. GCS eye subscore is 4. GCS verbal subscore is 5. GCS motor subscore is 6. Skin: She is not diaphoretic. Psychiatric: She has a normal mood and affect. Her behavior is normal.   Nursing note and vitals reviewed.       DIAGNOSTIC RESULTS     EKG: All EKG's are interpreted by the Emergency Department Physician who either signs or Co-signs this chart in the absence of a cardiologist.    Chantelle Fine is interpreted by myself without the assistance of cardiology  Rate 99  Rhythm sinus  No acute ST-T wave changes  No ectopy  No abnormalities no abnormal intervals    RADIOLOGY:   Non-plain film images such as CT, Ultrasound and MRI are read by the radiologist. Plain radiographic images are visualized and preliminarily interpreted by the emergency physician with the below findings:        Interpretation per the Radiologist below, if available at the time of this note:    No orders to display           LABS:  Results for orders placed or performed during the

## 2019-09-01 ENCOUNTER — APPOINTMENT (OUTPATIENT)
Dept: GENERAL RADIOLOGY | Age: 49
End: 2019-09-01
Payer: MEDICARE

## 2019-09-01 ENCOUNTER — HOSPITAL ENCOUNTER (EMERGENCY)
Age: 49
Discharge: HOME | End: 2019-09-01
Attending: EMERGENCY MEDICINE
Payer: MEDICARE

## 2019-09-01 VITALS
BODY MASS INDEX: 20.62 KG/M2 | SYSTOLIC BLOOD PRESSURE: 122 MMHG | OXYGEN SATURATION: 100 % | TEMPERATURE: 98 F | HEIGHT: 60 IN | RESPIRATION RATE: 18 BRPM | WEIGHT: 105 LBS | HEART RATE: 70 BPM | DIASTOLIC BLOOD PRESSURE: 58 MMHG

## 2019-09-01 DIAGNOSIS — E86.0 MILD DEHYDRATION: Primary | ICD-10-CM

## 2019-09-01 DIAGNOSIS — J18.9 PNEUMONIA OF LEFT LOWER LOBE DUE TO INFECTIOUS ORGANISM: ICD-10-CM

## 2019-09-01 DIAGNOSIS — R42 LIGHTHEADEDNESS: ICD-10-CM

## 2019-09-01 LAB
A/G RATIO: 1.3 (ref 1.1–2.2)
ALBUMIN SERPL-MCNC: 4.4 G/DL (ref 3.4–5)
ALP BLD-CCNC: 49 U/L (ref 40–129)
ALT SERPL-CCNC: 13 U/L (ref 10–40)
ANION GAP SERPL CALCULATED.3IONS-SCNC: 13 MMOL/L (ref 3–16)
AST SERPL-CCNC: 15 U/L (ref 15–37)
BASOPHILS ABSOLUTE: 0 K/UL (ref 0–0.2)
BASOPHILS RELATIVE PERCENT: 0.7 %
BILIRUB SERPL-MCNC: <0.2 MG/DL (ref 0–1)
BILIRUBIN URINE: NEGATIVE
BLOOD, URINE: NEGATIVE
BUN BLDV-MCNC: 11 MG/DL (ref 7–20)
CALCIUM SERPL-MCNC: 9.8 MG/DL (ref 8.3–10.6)
CHLORIDE BLD-SCNC: 106 MMOL/L (ref 99–110)
CLARITY: CLEAR
CO2: 22 MMOL/L (ref 21–32)
COLOR: YELLOW
CREAT SERPL-MCNC: 0.6 MG/DL (ref 0.6–1.1)
EOSINOPHILS ABSOLUTE: 0.2 K/UL (ref 0–0.6)
EOSINOPHILS RELATIVE PERCENT: 3.4 %
GFR AFRICAN AMERICAN: >60
GFR NON-AFRICAN AMERICAN: >60
GLOBULIN: 3.3 G/DL
GLUCOSE BLD-MCNC: 96 MG/DL (ref 70–99)
GLUCOSE URINE: NEGATIVE MG/DL
HCG QUALITATIVE: NEGATIVE
HCT VFR BLD CALC: 36.1 % (ref 36–48)
HEMOGLOBIN: 11.5 G/DL (ref 12–16)
KETONES, URINE: NEGATIVE MG/DL
LEUKOCYTE ESTERASE, URINE: NEGATIVE
LYMPHOCYTES ABSOLUTE: 1.3 K/UL (ref 1–5.1)
LYMPHOCYTES RELATIVE PERCENT: 21.1 %
MCH RBC QN AUTO: 26.3 PG (ref 26–34)
MCHC RBC AUTO-ENTMCNC: 31.8 G/DL (ref 31–36)
MCV RBC AUTO: 82.6 FL (ref 80–100)
MICROSCOPIC EXAMINATION: NORMAL
MONOCYTES ABSOLUTE: 0.4 K/UL (ref 0–1.3)
MONOCYTES RELATIVE PERCENT: 6.8 %
NEUTROPHILS ABSOLUTE: 4.3 K/UL (ref 1.7–7.7)
NEUTROPHILS RELATIVE PERCENT: 68 %
NITRITE, URINE: NEGATIVE
PDW BLD-RTO: 18.5 % (ref 12.4–15.4)
PH UA: 6 (ref 5–8)
PLATELET # BLD: 234 K/UL (ref 135–450)
PMV BLD AUTO: 7.6 FL (ref 5–10.5)
POTASSIUM REFLEX MAGNESIUM: 4.2 MMOL/L (ref 3.5–5.1)
PROTEIN UA: NEGATIVE MG/DL
RBC # BLD: 4.37 M/UL (ref 4–5.2)
SODIUM BLD-SCNC: 141 MMOL/L (ref 136–145)
SPECIFIC GRAVITY UA: 1.01 (ref 1–1.03)
TOTAL PROTEIN: 7.7 G/DL (ref 6.4–8.2)
TROPONIN: <0.01 NG/ML
URINE REFLEX TO CULTURE: NORMAL
URINE TYPE: NORMAL
UROBILINOGEN, URINE: 0.2 E.U./DL
WBC # BLD: 6.3 K/UL (ref 4–11)

## 2019-09-01 PROCEDURE — 84703 CHORIONIC GONADOTROPIN ASSAY: CPT

## 2019-09-01 PROCEDURE — 71045 X-RAY EXAM CHEST 1 VIEW: CPT

## 2019-09-01 PROCEDURE — 99284 EMERGENCY DEPT VISIT MOD MDM: CPT

## 2019-09-01 PROCEDURE — 36415 COLL VENOUS BLD VENIPUNCTURE: CPT

## 2019-09-01 PROCEDURE — 85025 COMPLETE CBC W/AUTO DIFF WBC: CPT

## 2019-09-01 PROCEDURE — 93005 ELECTROCARDIOGRAM TRACING: CPT | Performed by: EMERGENCY MEDICINE

## 2019-09-01 PROCEDURE — 84484 ASSAY OF TROPONIN QUANT: CPT

## 2019-09-01 PROCEDURE — 81003 URINALYSIS AUTO W/O SCOPE: CPT

## 2019-09-01 PROCEDURE — 80053 COMPREHEN METABOLIC PANEL: CPT

## 2019-09-01 RX ORDER — DOXYCYCLINE HYCLATE 100 MG
100 TABLET ORAL 2 TIMES DAILY
Qty: 14 TABLET | Refills: 0 | Status: SHIPPED | OUTPATIENT
Start: 2019-09-01 | End: 2019-09-08

## 2019-09-01 RX ORDER — 0.9 % SODIUM CHLORIDE 0.9 %
1000 INTRAVENOUS SOLUTION INTRAVENOUS ONCE
Status: DISCONTINUED | OUTPATIENT
Start: 2019-09-01 | End: 2019-09-01

## 2019-09-01 ASSESSMENT — PAIN SCALES - GENERAL
PAINLEVEL_OUTOF10: 0
PAINLEVEL_OUTOF10: 10

## 2019-09-01 ASSESSMENT — PAIN DESCRIPTION - LOCATION: LOCATION: GENERALIZED

## 2019-09-01 NOTE — ED PROVIDER NOTES
[facility]      CHIEF COMPLAINT  Nausea (pt has multiple complaints, c/o nausea, CP, diarrhea, body pain, tiredness,  cough, has been treated multiple times in ED in the last week for same issue)       HISTORY OF PRESENT ILLNESS  Shanika Sheridan is a 54 y.o. female  who presents to the ED complaining of multiple complaints. Patient arrived via EMS. Patient's primary complaint is that she feels dizziness. She states that she has passed out from anemia in the past and is concerned about this. She has not had any syncope today. She also states that she has had chest pain for the past 16 years since her child has been 3years old and continues to have this chest pain. She states that she has 2 types of chest pain when that is a gripping type of pain in her substernal area and does not currently have that chest pain but also chest discomfort across her lower chest that she sometimes gets when she has pneumonia. She has been having a significant cough. She states that she does not currently smoke but those around her do. She has a history of COPD and chronic bronchitis. Patient also states that she has been having diarrhea for the past week or so but that actually since last night she has not had any episodes because she feels like she just does not had not have any diarrhea left in her. She has had some cramping abdominal discomfort with this. No fevers. Patient was seen yesterday in our facility for the diarrhea. She was unable to provide a stool sample at that time. No vomiting. No other complaints, modifying factors or associated symptoms. I have reviewed the following from the nursing documentation.     Past Medical History:   Diagnosis Date    ADHD     Allergic rhinitis     Anemia     Anxiety     Arthritis     Asthma     B[facility] 1 disorder ([facility])     Chicken pox     as a child    COPD (chronic obstructive pulmonary disease) ([facility])     Depression     GERD albuterol sulfate HFA (PROVENTIL HFA) 108 (90 Base) MCG/ACT inhaler Inhale 2 puffs into the lungs every 6 hours as needed for Wheezing (with spacer) 1 Inhaler 0    benzonatate (TESSALON PERLES) 100 MG capsule Take 1 capsule by mouth 3 times daily as needed for Cough 15 capsule 0    omeprazole (PRILOSEC) 20 MG delayed release capsule Take 20 mg by mouth daily      fluticasone (FLONASE) 50 MCG/ACT nasal spray by Nasal route daily as needed       Meclizine HCl (ANTIVERT PO) Take 12.5 mg by mouth 3 times daily as needed      celecoxib (CELEBREX) 100 MG capsule Take 100 mg by mouth 2 times daily Unsure of dose      cyclobenzaprine (FLEXERIL) 10 MG tablet Take 10 mg by mouth 3 times daily as needed for Muscle spasms      calcium carbonate (OSCAL) 500 MG TABS tablet Take 1,000 mg by mouth daily      docusate sodium (COLACE) 100 MG capsule Take 100 mg by mouth 2 times daily as needed       Ferrous Sulfate (IRON) 325 (65 FE) MG TABS Take 325 mg by mouth 2 times daily. 60 tablet 2     No Known Allergies    REVIEW OF SYSTEMS  10 systems reviewed, pertinent positives per HPI otherwise noted to be negative. PHYSICAL EXAM  BP (!) 122/58   Pulse 70   Temp 98 °F (36.7 °C) (Oral)   Resp 18   Ht 5' (1.524 m)   Wt 105 lb (47.6 kg)   LMP 08/15/2019   SpO2 100%   Breastfeeding? No   BMI 20.51 kg/m²    GENERAL APPEARANCE: Awake and alert. Cooperative. No acute distress. HENT: Normocephalic. Atraumatic. Mucous membranes are moist.  No drooling or stridor. NECK: Supple. No cervical lymphadenopathy. No nuchal rigidity. EYES: PERRL. EOM's grossly intact. HEART/CHEST: RRR. No murmurs. LUNGS: Respirations unlabored. CTAB. No current wheezes, rales or rhonchi. Good air exchange. Speaking comfortably in full sentences. ABDOMEN: No tenderness. Soft. Non-distended. No masses. No organomegaly. No guarding or rebound. MUSCULOSKELETAL: No extremity edema. Compartments soft. No deformity.   No tenderness in the

## 2019-09-01 NOTE — ED NOTES
Bed: 03  Expected date:   Expected time:   Means of arrival:   Comments:  EMS     Twin Shannon RN  09/01/19 0001

## 2019-09-02 LAB
EKG ATRIAL RATE: 68 BPM
EKG DIAGNOSIS: NORMAL
EKG P AXIS: 67 DEGREES
EKG P-R INTERVAL: 124 MS
EKG Q-T INTERVAL: 390 MS
EKG QRS DURATION: 62 MS
EKG QTC CALCULATION (BAZETT): 414 MS
EKG R AXIS: 61 DEGREES
EKG T AXIS: 63 DEGREES
EKG VENTRICULAR RATE: 68 BPM

## 2019-09-02 PROCEDURE — 93010 ELECTROCARDIOGRAM REPORT: CPT | Performed by: INTERNAL MEDICINE

## 2019-09-10 ENCOUNTER — OFFICE VISIT (OUTPATIENT)
Dept: ORTHOPEDIC SURGERY | Age: 49
End: 2019-09-10
Payer: MEDICARE

## 2019-09-10 VITALS — WEIGHT: 104.94 LBS | BODY MASS INDEX: 20.6 KG/M2 | HEIGHT: 60 IN

## 2019-09-10 DIAGNOSIS — S82.831A CLOSED FRACTURE OF DISTAL END OF RIGHT FIBULA, UNSPECIFIED FRACTURE MORPHOLOGY, INITIAL ENCOUNTER: Primary | ICD-10-CM

## 2019-09-10 PROCEDURE — G8420 CALC BMI NORM PARAMETERS: HCPCS | Performed by: ORTHOPAEDIC SURGERY

## 2019-09-10 PROCEDURE — G8427 DOCREV CUR MEDS BY ELIG CLIN: HCPCS | Performed by: ORTHOPAEDIC SURGERY

## 2019-09-10 PROCEDURE — 99212 OFFICE O/P EST SF 10 MIN: CPT | Performed by: ORTHOPAEDIC SURGERY

## 2019-09-10 PROCEDURE — L1902 AFO ANKLE GAUNTLET PRE OTS: HCPCS | Performed by: ORTHOPAEDIC SURGERY

## 2019-09-10 PROCEDURE — 1036F TOBACCO NON-USER: CPT | Performed by: ORTHOPAEDIC SURGERY

## 2019-09-10 NOTE — PROGRESS NOTES
Subjective: Patient is here for follow-up of right fibula fracture treated nonoperatively. She states she is continuing to improve.   She feels unsteady though  Objective: Physical exam shows minimal swelling over the lateral aspect of the right ankle she has 10 degrees of dorsiflexion 40 degrees of plantarflexion strength is 3+ to 4-/5 in the dorsiflexion plantarflexion no midfoot instability  Imaging: 3 views of the right ankle show the fracture with good callus formation  Assessment and plan: Gave her prescription for physical therapy she will follow-up with me in a month repeat x-rays hopefully I can discharge her

## 2019-10-22 ENCOUNTER — OFFICE VISIT (OUTPATIENT)
Dept: ORTHOPEDIC SURGERY | Age: 49
End: 2019-10-22
Payer: MEDICARE

## 2019-10-22 VITALS — BODY MASS INDEX: 20.6 KG/M2 | WEIGHT: 104.94 LBS | HEIGHT: 60 IN

## 2019-10-22 DIAGNOSIS — S82.831A CLOSED FRACTURE OF DISTAL END OF RIGHT FIBULA, UNSPECIFIED FRACTURE MORPHOLOGY, INITIAL ENCOUNTER: Primary | ICD-10-CM

## 2019-10-22 PROBLEM — R42 DIZZINESS AND GIDDINESS: Status: ACTIVE | Noted: 2019-10-22

## 2019-10-22 PROBLEM — R11.2 NAUSEA WITH VOMITING: Status: ACTIVE | Noted: 2019-10-22

## 2019-10-22 PROBLEM — M48.00 SPINAL STENOSIS, OTHER THAN CERVICAL: Status: ACTIVE | Noted: 2019-10-22

## 2019-10-22 PROBLEM — J06.9 ACUTE URI: Status: ACTIVE | Noted: 2019-10-22

## 2019-10-22 PROBLEM — K92.1 BLOOD IN STOOL: Status: ACTIVE | Noted: 2019-10-22

## 2019-10-22 PROBLEM — R13.10 DYSPHAGIA: Status: ACTIVE | Noted: 2019-10-22

## 2019-10-22 PROBLEM — G47.00 INSOMNIA: Status: ACTIVE | Noted: 2019-10-22

## 2019-10-22 PROBLEM — R07.0 THROAT PAIN: Status: ACTIVE | Noted: 2019-10-22

## 2019-10-22 PROBLEM — R05.9 COUGH: Status: ACTIVE | Noted: 2019-10-22

## 2019-10-22 PROBLEM — R10.9 ABDOMINAL PAIN OF OTHER SPECIFIED SITE: Status: ACTIVE | Noted: 2018-01-24

## 2019-10-22 PROBLEM — D64.9 ANEMIA: Status: ACTIVE | Noted: 2019-10-22

## 2019-10-22 PROBLEM — R50.9 FEVER DUE TO UNSPECIFIED CONDITION: Status: ACTIVE | Noted: 2019-10-22

## 2019-10-22 PROBLEM — J20.9 ACUTE BRONCHITIS: Status: ACTIVE | Noted: 2019-10-22

## 2019-10-22 PROBLEM — R19.7 DIARRHEA: Status: ACTIVE | Noted: 2019-10-22

## 2019-10-22 PROBLEM — Z12.31 VISIT FOR SCREENING MAMMOGRAM: Status: ACTIVE | Noted: 2019-10-22

## 2019-10-22 PROBLEM — M79.609 PAIN IN SOFT TISSUES OF LIMB: Status: ACTIVE | Noted: 2019-10-22

## 2019-10-22 PROBLEM — J30.9 ALLERGIC RHINITIS: Status: ACTIVE | Noted: 2019-10-22

## 2019-10-22 PROBLEM — R19.7 DIARRHEA OF PRESUMED INFECTIOUS ORIGIN: Status: ACTIVE | Noted: 2019-10-22

## 2019-10-22 PROBLEM — E55.9 VITAMIN D DEFICIENCY: Status: ACTIVE | Noted: 2019-10-22

## 2019-10-22 PROBLEM — N39.0 URINARY TRACT INFECTION: Status: ACTIVE | Noted: 2019-10-22

## 2019-10-22 PROBLEM — M25.471 SWELLING OF BOTH ANKLES: Status: ACTIVE | Noted: 2019-10-22

## 2019-10-22 PROBLEM — M25.472 SWELLING OF BOTH ANKLES: Status: ACTIVE | Noted: 2019-10-22

## 2019-10-22 PROBLEM — J01.90 ACUTE SINUSITIS: Status: ACTIVE | Noted: 2019-10-22

## 2019-10-22 PROCEDURE — 1036F TOBACCO NON-USER: CPT | Performed by: ORTHOPAEDIC SURGERY

## 2019-10-22 PROCEDURE — G8427 DOCREV CUR MEDS BY ELIG CLIN: HCPCS | Performed by: ORTHOPAEDIC SURGERY

## 2019-10-22 PROCEDURE — 99212 OFFICE O/P EST SF 10 MIN: CPT | Performed by: ORTHOPAEDIC SURGERY

## 2019-10-22 PROCEDURE — G8420 CALC BMI NORM PARAMETERS: HCPCS | Performed by: ORTHOPAEDIC SURGERY

## 2019-10-22 PROCEDURE — G8484 FLU IMMUNIZE NO ADMIN: HCPCS | Performed by: ORTHOPAEDIC SURGERY

## 2019-10-24 ENCOUNTER — APPOINTMENT (OUTPATIENT)
Dept: CT IMAGING | Age: 49
End: 2019-10-24
Payer: MEDICARE

## 2019-10-24 ENCOUNTER — HOSPITAL ENCOUNTER (EMERGENCY)
Age: 49
Discharge: HOME | End: 2019-10-24
Attending: EMERGENCY MEDICINE
Payer: MEDICARE

## 2019-10-24 ENCOUNTER — APPOINTMENT (OUTPATIENT)
Dept: GENERAL RADIOLOGY | Age: 49
End: 2019-10-24
Payer: MEDICARE

## 2019-10-24 VITALS
RESPIRATION RATE: 20 BRPM | OXYGEN SATURATION: 100 % | TEMPERATURE: 97.5 F | SYSTOLIC BLOOD PRESSURE: 140 MMHG | DIASTOLIC BLOOD PRESSURE: 74 MMHG | BODY MASS INDEX: 20.03 KG/M2 | HEART RATE: 87 BPM | WEIGHT: 102 LBS | HEIGHT: 60 IN
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
## 2019-10-24 DIAGNOSIS — R04.0 EPISTAXIS: ICD-10-CM

## 2019-10-24 PROCEDURE — 72125 CT NECK SPINE W/O DYE: CPT

## 2019-10-24 PROCEDURE — 71046 X-RAY EXAM CHEST 2 VIEWS: CPT

## 2019-10-24 PROCEDURE — 99284 EMERGENCY DEPT VISIT MOD MDM: CPT

## 2019-10-24 PROCEDURE — 70486 CT MAXILLOFACIAL W/O DYE: CPT

## 2019-10-24 ASSESSMENT — PAIN DESCRIPTION - PAIN TYPE: TYPE: ACUTE PAIN

## 2019-10-24 ASSESSMENT — ENCOUNTER SYMPTOMS
COUGH: 0
NAUSEA: 0
RHINORRHEA: 1
VOMITING: 0
ABDOMINAL PAIN: 0
SORE THROAT: 0
TROUBLE SWALLOWING: 0
SHORTNESS OF BREATH: 1

## 2019-10-24 ASSESSMENT — PAIN DESCRIPTION - FREQUENCY: FREQUENCY: INTERMITTENT

## 2019-10-24 ASSESSMENT — PAIN DESCRIPTION - DESCRIPTORS: DESCRIPTORS: ACHING;DISCOMFORT

## 2019-10-24 ASSESSMENT — PAIN DESCRIPTION - ORIENTATION: ORIENTATION: LOWER

## 2019-10-24 ASSESSMENT — PAIN DESCRIPTION - LOCATION: LOCATION: MOUTH

## 2019-10-24 ASSESSMENT — PAIN SCALES - GENERAL: PAINLEVEL_OUTOF10: 2

## 2019-11-21 PROBLEM — R05.9 COUGH: Status: RESOLVED | Noted: 2019-10-22 | Resolved: 2019-11-21

## 2019-11-21 PROBLEM — Z12.31 VISIT FOR SCREENING MAMMOGRAM: Status: RESOLVED | Noted: 2019-10-22 | Resolved: 2019-11-21

## 2019-11-21 PROBLEM — N39.0 URINARY TRACT INFECTION: Status: RESOLVED | Noted: 2019-10-22 | Resolved: 2019-11-21

## 2020-05-07 ENCOUNTER — HOSPITAL ENCOUNTER (OUTPATIENT)
Age: 50
Discharge: HOME | End: 2020-05-07
Payer: MEDICARE

## 2020-05-07 LAB
HCT VFR BLD CALC: 35.5 % (ref 36–48)
HEMOGLOBIN: 11.2 G/DL (ref 12–16)
MCH RBC QN AUTO: 26.6 PG (ref 26–34)
MCHC RBC AUTO-ENTMCNC: 31.6 G/DL (ref 31–36)
MCV RBC AUTO: 84.3 FL (ref 80–100)
PDW BLD-RTO: 16.9 % (ref 12.4–15.4)
PLATELET # BLD: 285 K/UL (ref 135–450)
PMV BLD AUTO: 7.6 FL (ref 5–10.5)
RBC # BLD: 4.21 M/UL (ref 4–5.2)
WBC # BLD: 6 K/UL (ref 4–11)

## 2020-05-07 PROCEDURE — 83550 IRON BINDING TEST: CPT

## 2020-05-07 PROCEDURE — 82728 ASSAY OF FERRITIN: CPT

## 2020-05-07 PROCEDURE — 83540 ASSAY OF IRON: CPT

## 2020-05-07 PROCEDURE — 36415 COLL VENOUS BLD VENIPUNCTURE: CPT

## 2020-05-07 PROCEDURE — 84436 ASSAY OF TOTAL THYROXINE: CPT

## 2020-05-07 PROCEDURE — 84484 ASSAY OF TROPONIN QUANT: CPT

## 2020-05-07 PROCEDURE — 80053 COMPREHEN METABOLIC PANEL: CPT

## 2020-05-07 PROCEDURE — 83880 ASSAY OF NATRIURETIC PEPTIDE: CPT

## 2020-05-07 PROCEDURE — 84443 ASSAY THYROID STIM HORMONE: CPT

## 2020-05-07 PROCEDURE — 82306 VITAMIN D 25 HYDROXY: CPT

## 2020-05-07 PROCEDURE — 85027 COMPLETE CBC AUTOMATED: CPT

## 2020-05-08 LAB
A/G RATIO: 1.8 (ref 1.1–2.2)
ALBUMIN SERPL-MCNC: 4.6 G/DL (ref 3.4–5)
ALP BLD-CCNC: 43 U/L (ref 40–129)
ALT SERPL-CCNC: 10 U/L (ref 10–40)
ANION GAP SERPL CALCULATED.3IONS-SCNC: 12 MMOL/L (ref 3–16)
AST SERPL-CCNC: 14 U/L (ref 15–37)
BILIRUB SERPL-MCNC: 0.5 MG/DL (ref 0–1)
BUN BLDV-MCNC: 10 MG/DL (ref 7–20)
CALCIUM SERPL-MCNC: 9.5 MG/DL (ref 8.3–10.6)
CHLORIDE BLD-SCNC: 108 MMOL/L (ref 99–110)
CO2: 21 MMOL/L (ref 21–32)
CREAT SERPL-MCNC: 0.6 MG/DL (ref 0.6–1.1)
FERRITIN: 5 NG/ML (ref 15–150)
GFR AFRICAN AMERICAN: >60
GFR NON-AFRICAN AMERICAN: >60
GLOBULIN: 2.6 G/DL
GLUCOSE BLD-MCNC: 86 MG/DL (ref 70–99)
IRON SATURATION: 6 % (ref 15–50)
IRON: 26 UG/DL (ref 37–145)
POTASSIUM SERPL-SCNC: 4.3 MMOL/L (ref 3.5–5.1)
PRO-BNP: 45 PG/ML (ref 0–124)
SODIUM BLD-SCNC: 141 MMOL/L (ref 136–145)
T4 TOTAL: 5 UG/DL (ref 4.5–10.9)
TOTAL IRON BINDING CAPACITY: 450 UG/DL (ref 260–445)
TOTAL PROTEIN: 7.2 G/DL (ref 6.4–8.2)
TROPONIN: <0.01 NG/ML
TSH SERPL DL<=0.05 MIU/L-ACNC: 0.83 UIU/ML (ref 0.27–4.2)
VITAMIN D 25-HYDROXY: 13.8 NG/ML

## 2020-05-14 ENCOUNTER — INITIAL CONSULT (OUTPATIENT)
Dept: GASTROENTEROLOGY | Age: 50
End: 2020-05-14
Payer: MEDICARE

## 2020-05-14 VITALS
BODY MASS INDEX: 22.78 KG/M2 | WEIGHT: 116 LBS | DIASTOLIC BLOOD PRESSURE: 72 MMHG | SYSTOLIC BLOOD PRESSURE: 100 MMHG | HEIGHT: 60 IN

## 2020-05-14 PROBLEM — R19.7 DIARRHEA: Status: RESOLVED | Noted: 2019-10-22 | Resolved: 2020-05-14

## 2020-05-14 PROBLEM — K92.0 HEMATEMESIS: Status: ACTIVE | Noted: 2020-05-14

## 2020-05-14 PROBLEM — K62.5 RECTAL BLEEDING: Status: ACTIVE | Noted: 2020-05-14

## 2020-05-14 PROBLEM — R13.10 DYSPHAGIA: Status: RESOLVED | Noted: 2019-10-22 | Resolved: 2020-05-14

## 2020-05-14 PROBLEM — K21.9 GASTROESOPHAGEAL REFLUX DISEASE: Status: ACTIVE | Noted: 2020-05-14

## 2020-05-14 PROBLEM — R10.10 PAIN OF UPPER ABDOMEN: Status: ACTIVE | Noted: 2020-05-14

## 2020-05-14 PROBLEM — D50.9 IRON DEFICIENCY ANEMIA: Status: ACTIVE | Noted: 2020-05-14

## 2020-05-14 PROBLEM — R19.7 DIARRHEA OF PRESUMED INFECTIOUS ORIGIN: Status: RESOLVED | Noted: 2019-10-22 | Resolved: 2020-05-14

## 2020-05-14 PROCEDURE — G8427 DOCREV CUR MEDS BY ELIG CLIN: HCPCS | Performed by: INTERNAL MEDICINE

## 2020-05-14 PROCEDURE — 99204 OFFICE O/P NEW MOD 45 MIN: CPT | Performed by: INTERNAL MEDICINE

## 2020-05-14 PROCEDURE — 1036F TOBACCO NON-USER: CPT | Performed by: INTERNAL MEDICINE

## 2020-05-14 PROCEDURE — G8420 CALC BMI NORM PARAMETERS: HCPCS | Performed by: INTERNAL MEDICINE

## 2020-05-14 RX ORDER — POLYETHYLENE GLYCOL 3350 17 G/17G
238 POWDER ORAL ONCE
Qty: 238 G | Refills: 0 | Status: SHIPPED | OUTPATIENT
Start: 2020-05-14 | End: 2020-05-14

## 2020-05-14 RX ORDER — BISACODYL 5 MG
TABLET, DELAYED RELEASE (ENTERIC COATED) ORAL
Qty: 4 TABLET | Refills: 0 | Status: ON HOLD | OUTPATIENT
Start: 2020-05-14 | End: 2020-06-02 | Stop reason: HOSPADM

## 2020-05-14 NOTE — PROGRESS NOTES
[address]    [address] [place] [address] ,  [facility], [facility]  Phone: [phone]    CHIEF COMPLAINT     Chief Complaint   Patient presents with    New Patient     gerd, hematemesis, blood in stool, distended stomach, stomach pain        HPI     Brad Cortez is a 52 y.o. female who presents for multiple upper and lower GI symptoms - GERD, abdominal pain, complains of hematemesis, rectal bleeding, abdominal distention and bloating. PMH of ADD. Reports numerous symptoms - GERD, intermittent emesis going on for years and sees small amounts of red blood intermittently when she throws up. Chronic intermittent abdominal pain mostly upper abdomen. Patient is tangential in her responses and difficult to pin her down to talk about each symptom. She reports chronic abdominal bloating. Gives conflicting answers about meds. She says she has 'always been anemic' and has seen GI 'at least 2 times before once 10 years ago and then once maybe 1 or 2 years ago'. Apparently they had ordered EGD/colonoscopy but each time she ended up cancelling and not going through, says it was due to transport issues. Now has a neighbor that helps her with this and he was the one who drove her to this appt and is willing to transport for any procedures. Denies family history of colon cancer in first degree family members. She says she used to have constipation when young but now going every day. She has intermittent rectal bleeding with stools, generally small amounts. Her labs showed a iron deficiency anemia and she is supposed to be on ferrous sulfate for 'years' as this is not a new diagnosis for her but it seems she is not compliant with this.   Labs 5/7/2020- Hb 11.2, MCV 84, ferritin 5, iron saturation 6%  Creatinine normal, LFTs normal, TSH normal    PAST MEDICAL HISTORY     Past Medical History:   Diagnosis Date    ADHD     Allergic rhinitis     Anemia     Anxiety     Arthritis     Asthma prescribed during this encounter as epic can not insert only the list prior to this encounter.)  Current Outpatient Rx   Medication Sig Dispense Refill    albuterol sulfate HFA (PROVENTIL HFA) 108 (90 Base) MCG/ACT inhaler Inhale 2 puffs into the lungs every 6 hours as needed for Wheezing (with spacer) 1 Inhaler 0    benzonatate (TESSALON PERLES) 100 MG capsule Take 1 capsule by mouth 3 times daily as needed for Cough 15 capsule 0    omeprazole (PRILOSEC) 20 MG delayed release capsule Take 20 mg by mouth daily      fluticasone (FLONASE) 50 MCG/ACT nasal spray by Nasal route daily as needed       Meclizine HCl (ANTIVERT PO) Take 12.5 mg by mouth 3 times daily as needed      celecoxib (CELEBREX) 100 MG capsule Take 100 mg by mouth 2 times daily Unsure of dose      cyclobenzaprine (FLEXERIL) 10 MG tablet Take 10 mg by mouth 3 times daily as needed for Muscle spasms      calcium carbonate (OSCAL) 500 MG TABS tablet Take 1,000 mg by mouth daily      docusate sodium (COLACE) 100 MG capsule Take 100 mg by mouth 2 times daily as needed       Ferrous Sulfate (IRON) 325 (65 FE) MG TABS Take 325 mg by mouth 2 times daily. 60 tablet 2        ALLERGIES   No Known Allergies    IMMUNIZATIONS     Immunization History   Administered Date(s) Administered    Tdap (Boostrix, Adacel) 08/05/2015       REVIEW OF SYSTEMS     Constitutional: denies fever and unexpected weight change. HENT: Negative for ear pain, hearing loss and nosebleeds. Eyes: Negative for pain and visual disturbance. Respiratory: Negative for cough, shortness of breath and wheezing. Cardiovascular: Negative for chest pain, palpitations and leg swelling. Gastrointestinal: see HPI for details. Endocrine: Negative for polydipsia, polyphagia and polyuria. Genitourinary: Negative for difficulty urinating, dysuria, hematuria and urgency. Musculoskeletal: Positive for arthralgias and back pain. Skin: Negative for pallor and rash. Allergic/Immunologic: Negative for environmental allergies and immunocompromised state. Neurological: Negative for seizures, syncope. Hematological: Negative for adenopathy. Does not bruise/bleed easily. Psychiatric/Behavioral: Negative for agitation, confusion, hallucinations. PHYSICAL EXAM   VITAL SIGNS: /72 (Site: Left Upper Arm, Position: Sitting, Cuff Size: Large Adult)   Ht 5' (1.524 m)   Wt 116 lb (52.6 kg)   BMI 22.65 kg/m²   Wt Readings from Last 3 Encounters:   05/14/20 116 lb (52.6 kg)   10/24/19 102 lb (46.3 kg)   10/22/19 104 lb 15 oz (47.6 kg)     Gen: AAO3, NAD  HEENT: no pallor or icterus  Neck: supple, no adenopathy  RS: clear to auscultation bilaterally  CVS: S1S2 RRR, no murmurs  GI: soft, nontender, not distended, BS+, no hepatosplenomegaly  Neuro: non focal, no asterixis  Ext: no edema or clubbing    RADIOLOGY/PROCEDURES     See HPI    FINAL IMPRESSION     1. GERD, questionable mild hematemesis, intermittent rectal bleeding. She has mild anemia with iron deficiency. She needs a EGD to rule out erosive esophagitis, Barretts, PUD and a colonoscopy to rule out colonic neoplasia  Take Omeprazole 20 mg daily  Take ferrous sulfate daily  Avoid NSAIDs as far as possible. Will setup EGD and colonoscopy same day with MAC, Covid level 1. She will need a negative Covid test before any elective procedure. Has apparently no shown for GI procedures with other GI doctors due to transport issues that she says are not a problem any more. We discussed she needs to be compliant with the procedures when scheduled. 2. Consider right UQ US to assess gallbladder if EGD is negative.     CC:  Felisha Bah MondayLAZARO

## 2020-05-28 NOTE — PROGRESS NOTES
Unable to complete phone call with pt. As she was out of control talking and shouting and not answering questions or listening. I then spoke with her mother to review her hx and give instructions. The pt. Is not on any meds. To deal with her biploar, PTSD, ADHD and anxiety and suggested to her mother that she could get a referral from her family doctor for psych. Help and she will try that. She is aware that she needs Covid testing done today and has the prep info.

## 2020-05-28 NOTE — PROGRESS NOTES
Preoperative Screening for Elective Surgery/Invasive Procedures While COVID-19 present in the community     Have you tested positive or have been told to self-isolate for COVID-19 like symptoms within the past 28 days? No   Do you currently have any of the following symptoms? No  o Fever >100.0 F or 99.9 F in immunocompromised patients? No  o New onset cough, shortness of breath or difficulty breathing? No  o New onset sore throat, myalgia (muscle aches and pains), headache, loss of taste/smell or diarrhea? No   Have you had a potential exposure to COVID-19 within the past 14 days by:  o Close contact with a confirmed case? No  o Close contact with a healthcare worker,  or essential infrastructure worker (grocery store, [employer], gas station, public utilities or transportation)? No  o Do you reside in a congregate setting such as; skilled nursing facility, adult home, correctional facility, homeless shelter or other institutional setting? No  o Have you had recent travel to a known COVID-19 hotspot? No    Indicate if the patient has a positive screen by answering yes to one or more of the above questions. Patients who test positive or screen positive prior to surgery or on the day of surgery should be evaluated in conjunction with the surgeon/proceduralist/anesthesiologist to determine the urgency of the procedure.

## 2020-05-29 ENCOUNTER — HOSPITAL ENCOUNTER (OUTPATIENT)
Age: 50
Discharge: HOME | End: 2020-05-29
Payer: MEDICARE

## 2020-05-29 PROCEDURE — U0003 INFECTIOUS AGENT DETECTION BY NUCLEIC ACID (DNA OR RNA); SEVERE ACUTE RESPIRATORY SYNDROME CORONAVIRUS 2 (SARS-COV-2) (CORONAVIRUS DISEASE [COVID-19]), AMPLIFIED PROBE TECHNIQUE, MAKING USE OF HIGH THROUGHPUT TECHNOLOGIES AS DESCRIBED BY CMS-2020-01-R: HCPCS

## 2020-05-30 LAB
SARS-COV-2: NOT DETECTED
SOURCE: NORMAL

## 2020-06-02 ENCOUNTER — ANESTHESIA EVENT (OUTPATIENT)
Dept: ENDOSCOPY | Age: 50
End: 2020-06-02
Payer: MEDICARE

## 2020-06-02 ENCOUNTER — ANESTHESIA (OUTPATIENT)
Dept: ENDOSCOPY | Age: 50
End: 2020-06-02
Payer: MEDICARE

## 2020-06-02 ENCOUNTER — HOSPITAL ENCOUNTER (OUTPATIENT)
Age: 50
Setting detail: OUTPATIENT SURGERY
Discharge: HOME | End: 2020-06-02
Attending: INTERNAL MEDICINE | Admitting: INTERNAL MEDICINE
Payer: MEDICARE

## 2020-06-02 VITALS
HEART RATE: 68 BPM | HEIGHT: 60 IN | OXYGEN SATURATION: 100 % | RESPIRATION RATE: 20 BRPM | TEMPERATURE: 97.5 F | DIASTOLIC BLOOD PRESSURE: 94 MMHG | BODY MASS INDEX: 24.54 KG/M2 | WEIGHT: 125 LBS | SYSTOLIC BLOOD PRESSURE: 122 MMHG

## 2020-06-02 VITALS — OXYGEN SATURATION: 100 % | SYSTOLIC BLOOD PRESSURE: 112 MMHG | DIASTOLIC BLOOD PRESSURE: 69 MMHG

## 2020-06-02 PROBLEM — K29.30 CHRONIC SUPERFICIAL GASTRITIS WITHOUT BLEEDING: Status: ACTIVE | Noted: 2020-06-02

## 2020-06-02 LAB — PREGNANCY, URINE: NEGATIVE

## 2020-06-02 PROCEDURE — 6360000002 HC RX W HCPCS

## 2020-06-02 PROCEDURE — 88305 TISSUE EXAM BY PATHOLOGIST: CPT

## 2020-06-02 PROCEDURE — 3700000000 HC ANESTHESIA ATTENDED CARE: Performed by: INTERNAL MEDICINE

## 2020-06-02 PROCEDURE — 84703 CHORIONIC GONADOTROPIN ASSAY: CPT

## 2020-06-02 PROCEDURE — 3609012400 HC EGD TRANSORAL BIOPSY SINGLE/MULTIPLE: Performed by: INTERNAL MEDICINE

## 2020-06-02 PROCEDURE — 2709999900 HC NON-CHARGEABLE SUPPLY: Performed by: INTERNAL MEDICINE

## 2020-06-02 PROCEDURE — 45378 DIAGNOSTIC COLONOSCOPY: CPT | Performed by: INTERNAL MEDICINE

## 2020-06-02 PROCEDURE — 43239 EGD BIOPSY SINGLE/MULTIPLE: CPT | Performed by: INTERNAL MEDICINE

## 2020-06-02 PROCEDURE — 2500000003 HC RX 250 WO HCPCS

## 2020-06-02 PROCEDURE — 7100000011 HC PHASE II RECOVERY - ADDTL 15 MIN: Performed by: INTERNAL MEDICINE

## 2020-06-02 PROCEDURE — 3609027000 HC COLONOSCOPY: Performed by: INTERNAL MEDICINE

## 2020-06-02 PROCEDURE — 3700000001 HC ADD 15 MINUTES (ANESTHESIA): Performed by: INTERNAL MEDICINE

## 2020-06-02 PROCEDURE — 2580000003 HC RX 258: Performed by: INTERNAL MEDICINE

## 2020-06-02 PROCEDURE — 7100000010 HC PHASE II RECOVERY - FIRST 15 MIN: Performed by: INTERNAL MEDICINE

## 2020-06-02 RX ORDER — PROPOFOL 10 MG/ML
INJECTION, EMULSION INTRAVENOUS PRN
Status: DISCONTINUED | OUTPATIENT
Start: 2020-06-02 | End: 2020-06-02 | Stop reason: SDUPTHER

## 2020-06-02 RX ORDER — LIDOCAINE HYDROCHLORIDE 20 MG/ML
INJECTION, SOLUTION INFILTRATION; PERINEURAL PRN
Status: DISCONTINUED | OUTPATIENT
Start: 2020-06-02 | End: 2020-06-02 | Stop reason: SDUPTHER

## 2020-06-02 RX ORDER — SODIUM CHLORIDE, SODIUM LACTATE, POTASSIUM CHLORIDE, CALCIUM CHLORIDE 600; 310; 30; 20 MG/100ML; MG/100ML; MG/100ML; MG/100ML
INJECTION, SOLUTION INTRAVENOUS CONTINUOUS
Status: DISCONTINUED | OUTPATIENT
Start: 2020-06-02 | End: 2020-06-02 | Stop reason: HOSPADM

## 2020-06-02 RX ORDER — SODIUM CHLORIDE, SODIUM LACTATE, POTASSIUM CHLORIDE, CALCIUM CHLORIDE 600; 310; 30; 20 MG/100ML; MG/100ML; MG/100ML; MG/100ML
INJECTION, SOLUTION INTRAVENOUS CONTINUOUS PRN
Status: COMPLETED | OUTPATIENT
Start: 2020-06-02 | End: 2020-06-02

## 2020-06-02 RX ADMIN — SODIUM CHLORIDE, POTASSIUM CHLORIDE, SODIUM LACTATE AND CALCIUM CHLORIDE: 600; 310; 30; 20 INJECTION, SOLUTION INTRAVENOUS at 11:06

## 2020-06-02 RX ADMIN — PROPOFOL 250 MG: 10 INJECTION, EMULSION INTRAVENOUS at 12:10

## 2020-06-02 RX ADMIN — LIDOCAINE HYDROCHLORIDE 60 MG: 20 INJECTION, SOLUTION INFILTRATION; PERINEURAL at 12:10

## 2020-06-02 RX ADMIN — SODIUM CHLORIDE, POTASSIUM CHLORIDE, SODIUM LACTATE AND CALCIUM CHLORIDE: 600; 310; 30; 20 INJECTION, SOLUTION INTRAVENOUS at 12:04

## 2020-06-02 ASSESSMENT — PAIN SCALES - GENERAL
PAINLEVEL_OUTOF10: 3
PAINLEVEL_OUTOF10: 0

## 2020-06-02 ASSESSMENT — COPD QUESTIONNAIRES: CAT_SEVERITY: MILD

## 2020-06-02 ASSESSMENT — PAIN - FUNCTIONAL ASSESSMENT: PAIN_FUNCTIONAL_ASSESSMENT: 0-10

## 2020-06-02 NOTE — ANESTHESIA PRE PROCEDURE
Department of Anesthesiology  Preprocedure Note       Name:  Brad Cortez   Age:  52 y.o.  :  1970                                          MRN:  [id number]         Date:  2020      Surgeon: Breana Burleson):  Debby Sun MD    Procedure: Procedure(s):  COLONOSCOPY DIAGNOSTIC  EGD DIAGNOSTIC ONLY    Medications prior to admission:   Prior to Admission medications    Medication Sig Start Date End Date Taking? Authorizing Provider   bisacodyl (BISACODYL) 5 MG EC tablet Take 4 tablets of Bisacodyl for colonoscopy prep as directed. 20  Yes Debby Sun MD   albuterol sulfate HFA (PROVENTIL HFA) 108 (90 Base) MCG/ACT inhaler Inhale 2 puffs into the lungs every 6 hours as needed for Wheezing (with spacer) 19  Yes Jodie Rodriguez MD   benzonatate (TESSALON PERLES) 100 MG capsule Take 1 capsule by mouth 3 times daily as needed for Cough 19  Yes Jodie Rodriguez MD   fluticasone (FLONASE) 50 MCG/ACT nasal spray by Nasal route daily as needed  10/19/18  Yes Historical Provider, MD   Meclizine HCl (ANTIVERT PO) Take 12.5 mg by mouth 3 times daily as needed 14  Yes Historical Provider, MD   calcium carbonate (OSCAL) 500 MG TABS tablet Take 1,000 mg by mouth daily   Yes Historical Provider, MD   omeprazole (PRILOSEC) 20 MG delayed release capsule Take 20 mg by mouth daily 18   Historical Provider, MD   celecoxib (CELEBREX) 100 MG capsule Take 100 mg by mouth 2 times daily Unsure of dose    Historical Provider, MD   cyclobenzaprine (FLEXERIL) 10 MG tablet Take 10 mg by mouth 3 times daily as needed for Muscle spasms    Historical Provider, MD   docusate sodium (COLACE) 100 MG capsule Take 100 mg by mouth 2 times daily as needed     Historical Provider, MD   Ferrous Sulfate (IRON) 325 (65 FE) MG TABS Take 325 mg by mouth 2 times daily.  11   Yusef Andrew MD       Current medications:    Current Facility-Administered Medications   Medication Dose Route Frequency Provider Last Rate Last Dose    lactated ringers infusion   Intravenous Continuous Lazaro Phipps MD 30 mL/hr at 20 1106         Allergies:  No Known Allergies    Problem List:    Patient Active Problem List   Diagnosis Code    Bipolar affective disorder ([facility] F31.9    Asthma J45.909    Abdominal pain of other specified site R10.9    Acute bronchitis J20.9    Acute sinusitis J01.90    Acute URI J06.9    Allergic rhinitis J30.9    Anemia D64.9    Ankylosing spondylitis ([facility] M45.9    Blood in stool K92.1    Body mass index (bmi) 26.0-26.9, adult Z68.26    Dizziness and giddiness R42    Fever due to unspecified condition R50.9    Insomnia G47.00    Pain in soft tissues of limb M79.609    Spinal stenosis, other than cervical M48.00    Swelling of both ankles M25.471, M25.472    Throat pain R07.0    Nausea with vomiting R11.2    Vitamin D deficiency E55.9    Pain of upper abdomen R10.10    Gastroesophageal reflux disease K21.9    Rectal bleeding K62.5    Hematemesis K92.0    Iron deficiency anemia D50.9       Past Medical History:        Diagnosis Date    ADHD     Allergic rhinitis     Anemia     Anxiety     Arthritis     Asthma     Bipolar 1 disorder (HCC)     Chicken pox     as a child    COPD (chronic obstructive pulmonary disease) (HCC)     Depression     GERD (gastroesophageal reflux disease)     PTSD (post-traumatic stress disorder)        Past Surgical History:        Procedure Laterality Date    APPENDECTOMY       SECTION      x 3    TONSILLECTOMY         Social History:    Social History     Tobacco Use    Smoking status: Never Smoker    Smokeless tobacco: Never Used   Substance Use Topics    Alcohol use: Not Currently     Comment: occ                                Counseling given: Not Answered      Vital Signs (Current):   Vitals:    20 0933 20 1059   BP:  114/75   Pulse:  80   Resp:  20   Temp:  98.2 °F (36.8 °C)   TempSrc:

## 2020-06-02 NOTE — H&P
Chief Complaint   Patient presents with    New Patient       gerd, hematemesis, blood in stool, distended stomach, stomach pain         HPI      Pamela Brown is a 52 y.o. female who presents for multiple upper and lower GI symptoms - GERD, abdominal pain, complains of hematemesis, rectal bleeding, abdominal distention and bloating. PMH of ADD. Reports numerous symptoms - GERD, intermittent emesis going on for years and sees small amounts of red blood intermittently when she throws up. Chronic intermittent abdominal pain mostly upper abdomen. Patient is tangential in her responses and difficult to pin her down to talk about each symptom. She reports chronic abdominal bloating. Gives conflicting answers about meds. She says she has 'always been anemic' and has seen GI 'at least 2 times before once 10 years ago and then once maybe 1 or 2 years ago'. Apparently they had ordered EGD/colonoscopy but each time she ended up cancelling and not going through, says it was due to transport issues. Now has a neighbor that helps her with this and he was the one who drove her to this appt and is willing to transport for any procedures. Denies family history of colon cancer in first degree family members. She says she used to have constipation when young but now going every day. She has intermittent rectal bleeding with stools, generally small amounts. Her labs showed a iron deficiency anemia and she is supposed to be on ferrous sulfate for 'years' as this is not a new diagnosis for her but it seems she is not compliant with this.   Labs 5/7/2020- Hb 11.2, MCV 84, ferritin 5, iron saturation 6%  Creatinine normal, LFTs normal, TSH normal     PAST MEDICAL HISTORY      Past Medical History        Past Medical History:   Diagnosis Date    ADHD      Allergic rhinitis      Anemia      Anxiety      Arthritis      Asthma      Bipolar 1 disorder (HCC)      Chicken pox       as a child    COPD (chronic obstructive pulmonary list may include medications prescribed during this encounter as epic can not insert only the list prior to this encounter.)  Current Outpatient Rx          Current Outpatient Rx   Medication Sig Dispense Refill    albuterol sulfate HFA (PROVENTIL HFA) 108 (90 Base) MCG/ACT inhaler Inhale 2 puffs into the lungs every 6 hours as needed for Wheezing (with spacer) 1 Inhaler 0    benzonatate (TESSALON PERLES) 100 MG capsule Take 1 capsule by mouth 3 times daily as needed for Cough 15 capsule 0    omeprazole (PRILOSEC) 20 MG delayed release capsule Take 20 mg by mouth daily        fluticasone (FLONASE) 50 MCG/ACT nasal spray by Nasal route daily as needed         Meclizine HCl (ANTIVERT PO) Take 12.5 mg by mouth 3 times daily as needed        celecoxib (CELEBREX) 100 MG capsule Take 100 mg by mouth 2 times daily Unsure of dose        cyclobenzaprine (FLEXERIL) 10 MG tablet Take 10 mg by mouth 3 times daily as needed for Muscle spasms        calcium carbonate (OSCAL) 500 MG TABS tablet Take 1,000 mg by mouth daily        docusate sodium (COLACE) 100 MG capsule Take 100 mg by mouth 2 times daily as needed         Ferrous Sulfate (IRON) 325 (65 FE) MG TABS Take 325 mg by mouth 2 times daily. 60 tablet 2            ALLERGIES   No Known Allergies     IMMUNIZATIONS           Immunization History   Administered Date(s) Administered    Tdap (Boostrix, Adacel) 08/05/2015         REVIEW OF SYSTEMS      Constitutional: denies fever and unexpected weight change. HENT: Negative for ear pain, hearing loss and nosebleeds. Eyes: Negative for pain and visual disturbance. Respiratory: Negative for cough, shortness of breath and wheezing. Cardiovascular: Negative for chest pain, palpitations and leg swelling. Gastrointestinal: see HPI for details. Endocrine: Negative for polydipsia, polyphagia and polyuria. Genitourinary: Negative for difficulty urinating, dysuria, hematuria and urgency.    Musculoskeletal:

## 2020-09-20 ENCOUNTER — HOSPITAL ENCOUNTER (EMERGENCY)
Age: 50
Discharge: HOME | End: 2020-09-20
Attending: EMERGENCY MEDICINE
Payer: MEDICARE

## 2020-09-20 ENCOUNTER — APPOINTMENT (OUTPATIENT)
Dept: GENERAL RADIOLOGY | Age: 50
End: 2020-09-20
Payer: MEDICARE

## 2020-09-20 VITALS
HEIGHT: 60 IN | WEIGHT: 110 LBS | RESPIRATION RATE: 16 BRPM | SYSTOLIC BLOOD PRESSURE: 110 MMHG | BODY MASS INDEX: 21.6 KG/M2 | DIASTOLIC BLOOD PRESSURE: 80 MMHG | OXYGEN SATURATION: 96 % | HEART RATE: 84 BPM | TEMPERATURE: 98.6 F

## 2020-09-20 PROCEDURE — 99283 EMERGENCY DEPT VISIT LOW MDM: CPT

## 2020-09-20 PROCEDURE — 71046 X-RAY EXAM CHEST 2 VIEWS: CPT

## 2020-09-20 RX ORDER — BENZONATATE 100 MG/1
100 CAPSULE ORAL 3 TIMES DAILY PRN
Qty: 15 CAPSULE | Refills: 0 | Status: SHIPPED | OUTPATIENT
Start: 2020-09-20 | End: 2021-08-02 | Stop reason: ALTCHOICE

## 2020-09-20 RX ORDER — GABAPENTIN 400 MG/1
800 CAPSULE ORAL 3 TIMES DAILY
COMMUNITY

## 2020-09-20 ASSESSMENT — PAIN DESCRIPTION - LOCATION: LOCATION: CHEST

## 2020-09-20 ASSESSMENT — PAIN DESCRIPTION - PAIN TYPE: TYPE: ACUTE PAIN

## 2020-09-20 ASSESSMENT — PAIN DESCRIPTION - DESCRIPTORS: DESCRIPTORS: ACHING

## 2020-09-20 ASSESSMENT — PAIN SCALES - GENERAL: PAINLEVEL_OUTOF10: 8

## 2020-09-20 NOTE — ED NOTES
Pt DC home in good condition with RX x 1. V/u of Dc instructions. Denies questions or concerns. Teaching done re: s/s to report.      Gutierrez Martínez RN  09/20/20 2031

## 2020-09-20 NOTE — ED PROVIDER NOTES
Not Currently     Types: Marijuana    Sexual activity: Yes     Partners: Male   Lifestyle    Physical activity     Days per week: Not on file     Minutes per session: Not on file    Stress: Not on file   Relationships    Social connections     Talks on phone: Not on file     Gets together: Not on file     Attends Confucianism service: Not on file     Active member of club or organization: Not on file     Attends meetings of clubs or organizations: Not on file     Relationship status: Not on file    Intimate partner violence     Fear of current or ex partner: Not on file     Emotionally abused: Not on file     Physically abused: Not on file     Forced sexual activity: Not on file   Other Topics Concern    Not on file   Social History Narrative    Not on file     No current facility-administered medications for this encounter. Current Outpatient Medications   Medication Sig Dispense Refill    gabapentin (NEURONTIN) 400 MG capsule Take 800 mg by mouth 3 times daily.       albuterol sulfate HFA (PROVENTIL HFA) 108 (90 Base) MCG/ACT inhaler Inhale 2 puffs into the lungs every 6 hours as needed for Wheezing (with spacer) 1 Inhaler 0    benzonatate (TESSALON PERLES) 100 MG capsule Take 1 capsule by mouth 3 times daily as needed for Cough 15 capsule 0    omeprazole (PRILOSEC) 20 MG delayed release capsule Take 20 mg by mouth daily      fluticasone (FLONASE) 50 MCG/ACT nasal spray by Nasal route daily as needed       Meclizine HCl (ANTIVERT PO) Take 12.5 mg by mouth 3 times daily as needed      celecoxib (CELEBREX) 100 MG capsule Take 100 mg by mouth 2 times daily Unsure of dose      cyclobenzaprine (FLEXERIL) 10 MG tablet Take 10 mg by mouth 3 times daily as needed for Muscle spasms      calcium carbonate (OSCAL) 500 MG TABS tablet Take 1,000 mg by mouth daily      docusate sodium (COLACE) 100 MG capsule Take 100 mg by mouth 2 times daily as needed       Ferrous Sulfate (IRON) 325 (65 FE) MG TABS Take 325 mg by mouth 2 times daily. 60 tablet 2     No Known Allergies    REVIEW OF SYSTEMS  10 systems reviewed, pertinent positives and negatives per HPI, otherwise noted to be negative. PHYSICAL EXAM  ED Triage Vitals [09/20/20 1601]   Enc Vitals Group      /80      Pulse 86      Resp 18      Temp 98.6 °F (37 °C)      Temp Source Oral      SpO2 98 %      Weight 110 lb (49.9 kg)      Height 5' (1.524 m)      Head Circumference       Peak Flow       Pain Score       Pain Loc       Pain Edu? Excl. in [address]? General appearance: Awake and alert. Cooperative. No acute distress. HENT: Normocephalic. Atraumatic. Mucous membranes are moist.  Oropharynx is clear. No uvular deviation, no focal erythema or induration, no tonsillar exudate. Managing secretions easily. Neck: Supple. No meningismus. Eyes: PERRL. EOMI. Heart/Chest: RRR. No murmurs. Lungs: Respirations unlabored. CTAB. Good air exchange. Speaking comfortably in full sentences. Abdomen: Soft. Non-tender. Non-distended. No rebound or guarding. Musculoskeletal: No extremity edema. No deformity. No tenderness in the extremities. All extremities neurovascularly intact. Skin: Warm and dry. No acute rashes. Neurological: Alert and oriented. CN II-XII intact. Strength 5/5 bilateral upper and lower extremities. Sensation intact to light touch. Gait normal.  Psychiatric: Mood/affect: normal      LABS  I have reviewed all labs for this visit. No results found for this visit on 09/20/20. RADIOLOGY  I have reviewed all radiographic studies for this visit. XR CHEST (2 VW)   Final Result   No acute process. ED COURSE/MDM  Patient seen and evaluated. Old records reviewed. Labs and imaging reviewed and results discussed with patient/family to extent possible. 72-year-old female presents with cough, nasal congestion, and rhinorrhea most consistent with upper respiratory infection, likely viral in nature.   On arrival the patient is afebrile and nontoxic in appearance with otherwise reassuring vital signs. Oropharynx is clear. Presentation not consistent with meningitis, epiglottitis, peritonsillar abscess, retropharyngeal abscess, bacterial tracheitis. Chest x-ray negative. Not consistent with pneumonia. Not consistent with pulmonary embolism. Plan is supportive care and expectant management. Will prescribe Tessalon Perles for cough. Follow-up with PCP in several days. Usual strict return precautions for new or worsening symptoms communicated. During the patient's ED course, the patient was given:  Medications - No data to display     All questions were answered and the patient/family expressed understanding and agreement with the plan. PROCEDURES  None    CRITICAL CARE  N/A    CLINICAL IMPRESSION  1. Cough        DISPOSITION   Discharge     Carla Lazo MD    Note: This chart was created using voice recognition dictation software. Efforts were made by me to ensure accuracy, however some errors may be present due to limitations of this technology and occasionally words are not transcribed correctly.         Carla Lazo MD  09/20/20 5204

## 2020-09-21 ENCOUNTER — CARE COORDINATION (OUTPATIENT)
Dept: CARE COORDINATION | Age: 50
End: 2020-09-21

## 2020-09-21 NOTE — CARE COORDINATION
Care Transition phone outreach s/p acute care visit 9.20.20. Recording advises 'voice mailbox NOT set up'  Unable to leave message. Most current HIPAA notes NO alternate contact. Per HIPAA - outreach to pt ONLY. Will reattempt at later date/time.

## 2020-09-21 NOTE — CARE COORDINATION
Patient contacted regarding [facility]. Discussed COVID-19 related testing which was not done at this time. Test results were not done. Patient informed of results, if available? N/A    Care Transition Nurse/ Ambulatory Care Manager contacted the patient by telephone to perform post discharge assessment. Call within 2 business days of discharge: Yes. Verified name and  with patient as identifiers. Provided introduction to self, and explanation of the CTN/ACM role, and reason for call due to risk factors for infection and/or exposure to COVID-19. Symptoms reviewed with patient who verbalized the following symptoms: no new symptoms and no worsening symptoms. Due to no new or worsening symptoms encounter was not routed to provider for escalation. Discussed follow-up appointments. If no appointment was previously scheduled, appointment scheduling offered: Yes  [facility] follow up appointment(s): No future appointments. States intent to self outreach to PCP for scheduling; declines need/want for assistance  Non-[facility] follow up appointment(s):     Non-face-to-face services provided:  Obtained and reviewed discharge summary and/or continuity of care documents     Advance Care Planning:   Does patient have an Advance Directive:  not on file; education provided. Patient has following risk factors of: asthma. CTN/ACM reviewed discharge instructions, medical action plan and red flags such as increased shortness of breath, increasing fever and signs of decompensation with patient who verbalized understanding. Discussed exposure protocols and quarantine with CDC Guidelines What to do if you are sick with coronavirus disease 2019.  Patient was given an opportunity for questions and concerns. The patient agrees to contact the [facility] exposure line [phone], local [facility] department [place] [facility]: ([phone]) and PCP office for questions related to their healthcare.  CTN/ACM provided contact information for future needs. Reviewed and educated patient on any new and changed medications related to discharge diagnosis     Patient/family/caregiver given information for Get[employer] Loop and agrees to enroll yes  Patient's preferred e-mail: [email or web address]   Patient's preferred phone number: [phone]  Based on Loop alert triggers, patient will be contacted by nurse care manager for worsening symptoms. Pt verbalized understanding of above and agreement with the following  Plan:  Pt will be further monitored by COVID Loop Team based on severity of symptoms and risk factors.

## 2021-02-04 ENCOUNTER — HOSPITAL ENCOUNTER (EMERGENCY)
Age: 51
Discharge: HOME | End: 2021-02-04
Attending: EMERGENCY MEDICINE
Payer: MEDICARE

## 2021-02-04 ENCOUNTER — APPOINTMENT (OUTPATIENT)
Dept: GENERAL RADIOLOGY | Age: 51
End: 2021-02-04
Payer: MEDICARE

## 2021-02-04 VITALS
OXYGEN SATURATION: 100 % | SYSTOLIC BLOOD PRESSURE: 91 MMHG | HEIGHT: 60 IN | DIASTOLIC BLOOD PRESSURE: 78 MMHG | BODY MASS INDEX: 23.56 KG/M2 | RESPIRATION RATE: 22 BRPM | WEIGHT: 120 LBS | HEART RATE: 100 BPM | TEMPERATURE: 99 F

## 2021-02-04 DIAGNOSIS — F41.1 ANXIETY STATE: ICD-10-CM

## 2021-02-04 DIAGNOSIS — R68.89 FLU-LIKE SYMPTOMS: ICD-10-CM

## 2021-02-04 DIAGNOSIS — R07.9 CHEST PAIN, UNSPECIFIED TYPE: Primary | ICD-10-CM

## 2021-02-04 DIAGNOSIS — R51.9 ACUTE NONINTRACTABLE HEADACHE, UNSPECIFIED HEADACHE TYPE: ICD-10-CM

## 2021-02-04 DIAGNOSIS — R05.9 COUGH: ICD-10-CM

## 2021-02-04 DIAGNOSIS — Z20.822 ENCOUNTER FOR LABORATORY TESTING FOR COVID-19 VIRUS: ICD-10-CM

## 2021-02-04 DIAGNOSIS — R45.851 SUICIDAL THOUGHTS: ICD-10-CM

## 2021-02-04 DIAGNOSIS — D64.9 ANEMIA, UNSPECIFIED TYPE: ICD-10-CM

## 2021-02-04 DIAGNOSIS — R45.851 PASSIVE SUICIDAL IDEATIONS: ICD-10-CM

## 2021-02-04 LAB
A/G RATIO: 1.4 (ref 1.1–2.2)
ALBUMIN SERPL-MCNC: 4.3 G/DL (ref 3.4–5)
ALP BLD-CCNC: 58 U/L (ref 40–129)
ALT SERPL-CCNC: 7 U/L (ref 10–40)
ANION GAP SERPL CALCULATED.3IONS-SCNC: 10 MMOL/L (ref 3–16)
AST SERPL-CCNC: 12 U/L (ref 15–37)
BASOPHILS ABSOLUTE: 0 K/UL (ref 0–0.2)
BASOPHILS RELATIVE PERCENT: 0.9 %
BILIRUB SERPL-MCNC: 0.7 MG/DL (ref 0–1)
BUN BLDV-MCNC: 11 MG/DL (ref 7–20)
CALCIUM SERPL-MCNC: 9.3 MG/DL (ref 8.3–10.6)
CHLORIDE BLD-SCNC: 107 MMOL/L (ref 99–110)
CO2: 22 MMOL/L (ref 21–32)
CREAT SERPL-MCNC: 0.6 MG/DL (ref 0.6–1.1)
D DIMER: <200 NG/ML DDU (ref 0–229)
EKG ATRIAL RATE: 81 BPM
EKG DIAGNOSIS: NORMAL
EKG P AXIS: 66 DEGREES
EKG P-R INTERVAL: 138 MS
EKG Q-T INTERVAL: 378 MS
EKG QRS DURATION: 66 MS
EKG QTC CALCULATION (BAZETT): 439 MS
EKG R AXIS: 67 DEGREES
EKG T AXIS: 62 DEGREES
EKG VENTRICULAR RATE: 81 BPM
EOSINOPHILS ABSOLUTE: 0.2 K/UL (ref 0–0.6)
EOSINOPHILS RELATIVE PERCENT: 3.7 %
ETHANOL: NORMAL MG/DL (ref 0–0.08)
GFR AFRICAN AMERICAN: >60
GFR NON-AFRICAN AMERICAN: >60
GLOBULIN: 3 G/DL
GLUCOSE BLD-MCNC: 100 MG/DL (ref 70–99)
HCG QUALITATIVE: NEGATIVE
HCT VFR BLD CALC: 33.7 % (ref 36–48)
HEMOGLOBIN: 10.6 G/DL (ref 12–16)
LYMPHOCYTES ABSOLUTE: 1.4 K/UL (ref 1–5.1)
LYMPHOCYTES RELATIVE PERCENT: 27.2 %
MCH RBC QN AUTO: 25.1 PG (ref 26–34)
MCHC RBC AUTO-ENTMCNC: 31.5 G/DL (ref 31–36)
MCV RBC AUTO: 79.7 FL (ref 80–100)
MONOCYTES ABSOLUTE: 0.3 K/UL (ref 0–1.3)
MONOCYTES RELATIVE PERCENT: 6.7 %
NEUTROPHILS ABSOLUTE: 3.1 K/UL (ref 1.7–7.7)
NEUTROPHILS RELATIVE PERCENT: 61.5 %
PDW BLD-RTO: 20 % (ref 12.4–15.4)
PLATELET # BLD: 298 K/UL (ref 135–450)
PMV BLD AUTO: 8.2 FL (ref 5–10.5)
POTASSIUM REFLEX MAGNESIUM: 3.9 MMOL/L (ref 3.5–5.1)
RAPID INFLUENZA  B AGN: NEGATIVE
RAPID INFLUENZA A AGN: NEGATIVE
RBC # BLD: 4.22 M/UL (ref 4–5.2)
SODIUM BLD-SCNC: 139 MMOL/L (ref 136–145)
TOTAL PROTEIN: 7.3 G/DL (ref 6.4–8.2)
TROPONIN: <0.01 NG/ML
TROPONIN: <0.01 NG/ML
WBC # BLD: 5.1 K/UL (ref 4–11)

## 2021-02-04 PROCEDURE — 71045 X-RAY EXAM CHEST 1 VIEW: CPT

## 2021-02-04 PROCEDURE — 82077 ASSAY SPEC XCP UR&BREATH IA: CPT

## 2021-02-04 PROCEDURE — 85379 FIBRIN DEGRADATION QUANT: CPT

## 2021-02-04 PROCEDURE — U0003 INFECTIOUS AGENT DETECTION BY NUCLEIC ACID (DNA OR RNA); SEVERE ACUTE RESPIRATORY SYNDROME CORONAVIRUS 2 (SARS-COV-2) (CORONAVIRUS DISEASE [COVID-19]), AMPLIFIED PROBE TECHNIQUE, MAKING USE OF HIGH THROUGHPUT TECHNOLOGIES AS DESCRIBED BY CMS-2020-01-R: HCPCS

## 2021-02-04 PROCEDURE — 87804 INFLUENZA ASSAY W/OPTIC: CPT

## 2021-02-04 PROCEDURE — 99284 EMERGENCY DEPT VISIT MOD MDM: CPT

## 2021-02-04 PROCEDURE — 80053 COMPREHEN METABOLIC PANEL: CPT

## 2021-02-04 PROCEDURE — 6370000000 HC RX 637 (ALT 250 FOR IP): Performed by: EMERGENCY MEDICINE

## 2021-02-04 PROCEDURE — 36415 COLL VENOUS BLD VENIPUNCTURE: CPT

## 2021-02-04 PROCEDURE — 85025 COMPLETE CBC W/AUTO DIFF WBC: CPT

## 2021-02-04 PROCEDURE — 84703 CHORIONIC GONADOTROPIN ASSAY: CPT

## 2021-02-04 PROCEDURE — 93010 ELECTROCARDIOGRAM REPORT: CPT | Performed by: INTERNAL MEDICINE

## 2021-02-04 PROCEDURE — 93005 ELECTROCARDIOGRAM TRACING: CPT | Performed by: EMERGENCY MEDICINE

## 2021-02-04 PROCEDURE — 84484 ASSAY OF TROPONIN QUANT: CPT

## 2021-02-04 RX ORDER — DIVALPROEX SODIUM 500 MG/1
500 TABLET, DELAYED RELEASE ORAL 3 TIMES DAILY
COMMUNITY
End: 2021-08-02 | Stop reason: ALTCHOICE

## 2021-02-04 RX ORDER — IPRATROPIUM BROMIDE AND ALBUTEROL SULFATE 2.5; .5 MG/3ML; MG/3ML
1 SOLUTION RESPIRATORY (INHALATION) ONCE
Status: COMPLETED | OUTPATIENT
Start: 2021-02-04 | End: 2021-02-04

## 2021-02-04 RX ORDER — ACETAMINOPHEN 325 MG/1
650 TABLET ORAL ONCE
Status: COMPLETED | OUTPATIENT
Start: 2021-02-04 | End: 2021-02-04

## 2021-02-04 RX ADMIN — NITROGLYCERIN 0.5 INCH: 20 OINTMENT TOPICAL at 12:20

## 2021-02-04 RX ADMIN — ACETAMINOPHEN 650 MG: 325 TABLET ORAL at 12:20

## 2021-02-04 RX ADMIN — IPRATROPIUM BROMIDE AND ALBUTEROL SULFATE 1 AMPULE: .5; 3 SOLUTION RESPIRATORY (INHALATION) at 12:20

## 2021-02-04 ASSESSMENT — PAIN DESCRIPTION - PAIN TYPE: TYPE: ACUTE PAIN;CHRONIC PAIN

## 2021-02-04 ASSESSMENT — PAIN DESCRIPTION - LOCATION: LOCATION: CHEST

## 2021-02-04 ASSESSMENT — ENCOUNTER SYMPTOMS
DIARRHEA: 0
ABDOMINAL PAIN: 0
COLOR CHANGE: 0
COUGH: 1
SHORTNESS OF BREATH: 1
VOMITING: 0
CHEST TIGHTNESS: 1
NAUSEA: 1
BACK PAIN: 0

## 2021-02-04 ASSESSMENT — HEART SCORE: ECG: 0

## 2021-02-04 NOTE — ED NOTES
Spoke to [employer] in [facility]. States pt can be DCed. MD made aware.      Gideon Hurtado RN  02/04/21 8603

## 2021-02-04 NOTE — ED NOTES
Upon triage pt denied any suicidal ideation to this RN. However, upon MD assessment, MD states pt admits to \"daily thoughts of suicide but is in counseling for that\". MD states he is ordering psych consult for pt. Informed pt and requested her to sign consent for telehealth. Pt refused and states, \"I don't have time to stay so I will follow up with my counselor\". Informed MD. MD states pt will be placed on hold until after psych eval completed. Pt made aware. Consent for telehealth signed. Spoke to Francis Kessler in [employer] at [facility]. States she will call back when available. Continuing to monitor pt.      Sammi Ruiz RN  02/04/21 0759

## 2021-02-04 NOTE — ED PROVIDER NOTES
but nothing out of the ordinary for her. No weakness in the arms or legs. No falls or trauma. No syncope. She does feel lightheaded but that is also normal with her chest pain. She states that 2 years ago she was supposed to get an angiogram but never went to the appointment since she could not get a ride and she denies ever following up in regards to this. She is currently admitting to passive suicidal thoughts but again has no current plan at this point and states she always has these. He states she would never hurt herself since she has animals at home that are her companions and she would never want to leave this world and abandoned them. Her main concern was her chest pain so she came to the ED for further assessment. REVIEW OF SYSTEMS    (2-9 systems for level 4, 10 or more for level 5)     Review of Systems   Constitutional: Positive for chills, fatigue and fever. Negative for appetite change and diaphoresis. HENT: Negative for congestion. Respiratory: Positive for cough, chest tightness and shortness of breath. Cardiovascular: Positive for chest pain. Negative for palpitations and leg swelling. Gastrointestinal: Positive for nausea. Negative for abdominal pain, diarrhea and vomiting. Genitourinary: Negative for dysuria and flank pain. Musculoskeletal: Negative for back pain and neck pain. Skin: Negative for color change. Neurological: Positive for light-headedness, numbness (tingling intermittently to left arm for years) and headaches. Negative for syncope and weakness. Psychiatric/Behavioral: Positive for suicidal ideas. Negative for confusion and self-injury. The patient is nervous/anxious. Positives and Pertinent negatives as per HPI.       PASTMEDICAL HISTORY     Past Medical History:   Diagnosis Date    ADHD     Allergic rhinitis     Anemia     Anxiety     Arthritis     Asthma     Bipolar 1 disorder (HCC)     Chicken pox     as a child    COPD (chronic obstructive pulmonary disease) (HCC)     Depression     GERD (gastroesophageal reflux disease)     PTSD (post-traumatic stress disorder)          SURGICAL HISTORY       Past Surgical History:   Procedure Laterality Date    APPENDECTOMY       SECTION      x 3    COLONOSCOPY N/A 2020    COLONOSCOPY DIAGNOSTIC performed by Sweetie Richards MD at [facility] N/A 2020    EGD BIOPSY performed by Sweetie Richards MD at [address]       Discharge Medication List as of 2021  4:05 PM      CONTINUE these medications which have NOT CHANGED    Details   Nitroglycerin (NITRO-TIME PO) Take by mouthHistorical Med      divalproex (DEPAKOTE) 500 MG DR tablet Take 500 mg by mouth 3 times dailyHistorical Med      gabapentin (NEURONTIN) 400 MG capsule Take 800 mg by mouth 3 times daily. Historical Med      benzonatate (TESSALON PERLES) 100 MG capsule Take 1 capsule by mouth 3 times daily as needed for Cough, Disp-15 capsule,R-0Print      albuterol sulfate HFA (PROVENTIL HFA) 108 (90 Base) MCG/ACT inhaler Inhale 2 puffs into the lungs every 6 hours as needed for Wheezing (with spacer), Disp-1 Inhaler, R-0Print      omeprazole (PRILOSEC) 20 MG delayed release capsule Take 20 mg by mouth dailyHistorical Med      fluticasone (FLONASE) 50 MCG/ACT nasal spray by Nasal route daily as needed Historical Med      Meclizine HCl (ANTIVERT PO) Take 12.5 mg by mouth 3 times daily as neededHistorical Med      celecoxib (CELEBREX) 100 MG capsule Take 100 mg by mouth 2 times daily Unsure of dose      cyclobenzaprine (FLEXERIL) 10 MG tablet Take 10 mg by mouth 3 times daily as needed for Muscle spasms      calcium carbonate (OSCAL) 500 MG TABS tablet Take 1,000 mg by mouth daily      docusate sodium (COLACE) 100 MG capsule Take 100 mg by mouth 2 times daily as needed Historical Med      Ferrous Sulfate (IRON) 325 (65 FE) MG TABS Take 325 mg by mouth 2 times daily. , Disp-60 tablet, R-2             ALLERGIES     Patient has no known allergies. FAMILY HISTORY       Family History   Adopted: Yes          SOCIAL HISTORY       Social History     Socioeconomic History    Marital status: Legally      Spouse name: None    Number of children: None    Years of education: None    Highest education level: None   Occupational History    None   Social Needs    Financial resource strain: None    Food insecurity     Worry: None     Inability: None    Transportation needs     Medical: None     Non-medical: None   Tobacco Use    Smoking status: Never Smoker    Smokeless tobacco: Never Used   Substance and Sexual Activity    Alcohol use: Not Currently     Comment: occ    Drug use: Not Currently     Types: Marijuana    Sexual activity: Yes     Partners: Male   Lifestyle    Physical activity     Days per week: None     Minutes per session: None    Stress: None   Relationships    Social connections     Talks on phone: None     Gets together: None     Attends Cheondoism service: None     Active member of club or organization: None     Attends meetings of clubs or organizations: None     Relationship status: None    Intimate partner violence     Fear of current or ex partner: None     Emotionally abused: None     Physically abused: None     Forced sexual activity: None   Other Topics Concern    None   Social History Narrative    None       SCREENINGS    [place] Coma Scale  Eye Opening: Spontaneous  Best Verbal Response: Oriented  Best Motor Response: Obeys commands  [place] Coma Scale Score: 15 Heart Score for chest pain patients  History:  Moderately Suspicious  ECG: Normal  Patient Age: > 45 and < 65 years  Risk Factors: 1 or 2 risk factors  Troponin: < 1X normal limit  Heart Score Total: 3    History: 1  EC  Patient Age: 3  Risk Factors: 1  Troponin: 0  Heart Score Total: 3      PHYSICAL EXAM    (up to 7 for level 4, 8 or more for level 5)     ED Triage Vitals   BP Temp Temp src Pulse Resp SpO2 Height Weight   -- -- -- -- -- -- -- --       Physical Exam  Vitals signs and nursing note reviewed. Constitutional:       General: She is awake. She is not in acute distress. Appearance: Normal appearance. She is well-developed, well-groomed and normal weight. She is not ill-appearing, toxic-appearing or diaphoretic. Interventions: She is not intubated. HENT:      Head: Normocephalic and atraumatic. Right Ear: External ear normal.      Left Ear: External ear normal.      Nose: Nose normal.      Mouth/Throat:      Mouth: Mucous membranes are moist.   Eyes:      General:         Right eye: No discharge. Left eye: No discharge. Extraocular Movements: Extraocular movements intact. Conjunctiva/sclera: Conjunctivae normal.      Pupils: Pupils are equal, round, and reactive to light. Neck:      Musculoskeletal: Full passive range of motion without pain, normal range of motion and neck supple. Normal range of motion. No edema, erythema, neck rigidity, crepitus, injury, pain with movement, torticollis, spinous process tenderness or muscular tenderness. Trachea: Trachea and phonation normal. No tracheal deviation. Comments: No nuchal rigidity   Cardiovascular:      Rate and Rhythm: Normal rate and regular rhythm. Pulses: Normal pulses. Pulmonary:      Effort: Pulmonary effort is normal. No tachypnea, bradypnea, accessory muscle usage, prolonged expiration, respiratory distress or retractions. She is not intubated. Breath sounds: Normal air entry. No stridor, decreased air movement or transmitted upper airway sounds. Examination of the right-upper field reveals wheezing. Examination of the left-upper field reveals wheezing. Examination of the right-middle field reveals wheezing. Examination of the left-middle field reveals wheezing.  Examination of the right-lower field reveals wheezing. Examination of the left-lower field reveals wheezing. Wheezing (end-expiratory) present. No decreased breath sounds, rhonchi or rales. Chest:      Chest wall: No tenderness. Abdominal:      General: Bowel sounds are normal. There is no distension. Palpations: Abdomen is soft. Abdomen is not rigid. Tenderness: There is no abdominal tenderness. There is no guarding or rebound. Negative signs include Short's sign and McBurney's sign. Musculoskeletal: Normal range of motion. General: No swelling, tenderness, deformity or signs of injury. Right lower leg: No edema. Left lower leg: No edema. Skin:     General: Skin is warm and dry. Coloration: Skin is not jaundiced or pale. Findings: No bruising, erythema, lesion or rash. Neurological:      General: No focal deficit present. Mental Status: She is alert and oriented to person, place, and time. Mental status is at baseline. GCS: GCS eye subscore is 4. GCS verbal subscore is 5. GCS motor subscore is 6. Cranial Nerves: Cranial nerves are intact. No cranial nerve deficit, dysarthria or facial asymmetry. Sensory: Sensory deficit (tingling to left arm patient states comes and goes with her chest pain and has happened for years, no other numbness (states both legs and both sides of face feel overall the same and she denies any noticeable difference)) present. Motor: Motor function is intact. No weakness, tremor, atrophy, abnormal muscle tone or seizure activity. Psychiatric:         Attention and Perception: Attention and perception normal. She is attentive. She does not perceive auditory or visual hallucinations. Mood and Affect: Affect normal. Mood is anxious. Mood is not elated. Affect is not labile, blunt, flat, angry or tearful. Speech: Speech normal. Speech is not slurred. Behavior: Behavior normal. Behavior is cooperative. Thought Content:  Thought content is not paranoid or delusional. Thought content includes suicidal (reports passive thoughts ) ideation. Thought content does not include homicidal ideation. Thought content does not include homicidal or suicidal plan. DIAGNOSTIC RESULTS   :    Labs Reviewed   CBC WITH AUTO DIFFERENTIAL - Abnormal; Notable for the following components:       Result Value    Hemoglobin 10.6 (*)     Hematocrit 33.7 (*)     MCV 79.7 (*)     MCH 25.1 (*)     RDW 20.0 (*)     All other components within normal limits    Narrative:     Performed at:  [facility]  [address] [place] [address]. [place] [facility] [address]   Phone [phone]   COMPREHENSIVE METABOLIC PANEL W/ REFLEX TO MG FOR LOW K - Abnormal; Notable for the following components:    Glucose 100 (*)     ALT 7 (*)     AST 12 (*)     All other components within normal limits    Narrative:     Performed at:  [facility]  [address] [place] [address]. [place] [address] [address]   Phone [phone]   RAPID INFLUENZA A/B ANTIGENS    Narrative:     Performed at:  [facility]  [address] [place] [address]. [facility] [address]   Phone [phone]   TROPONIN    Narrative:     Performed at:  [facility]  [address] [place] [address]. [facility] [address] [employer]   Phone [phone]   HCG, SERUM, QUALITATIVE    Narrative:     Performed at:  [facility]  [address] [place] [address]. [facility] [address] [employer]   Phone [phone]   ETHANOL    Narrative:     Performed at:  [facility]  [address] [place] [address]. [place] [facility]   Phone [phone]   TROPONIN    Narrative:     Performed at:  [facility]  [address] [place] [address].  [facility] [address]   Phone [phone]   D-DIMER, QUANTITATIVE    Narrative:     Performed at:  [address] [place] [address] [address] [facility]  [facility] [place], [place]. [place], [address]   Phone [phone]       All other labs were within normal range or not returned asof this dictation. EKG: All EKG's are interpreted by the Emergency Department Physician who either signs or Co-signs this chart in the absence of a cardiologist.    The Ekg interpreted by me shows  normal sinus rhythm with a rate of 81  Axis is   Normal  QTc is  normal  Intervals and Durations are unremarkable. ST Segments: no acute change and normal  No significant change from prior EKG dated - 9/1/19  No STEMI           RADIOLOGY:   Non-plain film images such as CT, Ultrasound and MRI are read by the radiologist. [place] images are visualized and preliminarily interpreted by the  ED Provider with the belowfindings:        Interpretation per the Radiologist below, if available at the time of this note:    XR CHEST PORTABLE   Final Result   No acute process. PROCEDURES   Unless otherwise noted below, none     Procedures    CRITICAL CARE TIME   N/A    CONSULTS:  None    EMERGENCY DEPARTMENT COURSE and DIFFERENTIAL DIAGNOSIS/MDM:   Vitals:    Vitals:    02/04/21 1300 02/04/21 1415 02/04/21 1440 02/04/21 1543   BP: 119/69 (!) 97/42 107/86 91/78   Pulse: 102 97 97 100   Resp: 19 (!) 38 19 22   Temp:       TempSrc:       SpO2: 100% 100% 100% 100%   Weight:       Height:           Patient was given the following medications:  Medications   ipratropium-albuterol (DUONEB) nebulizer solution 1 ampule (1 ampule Inhalation Given 2/4/21 1220)   acetaminophen (TYLENOL) tablet 650 mg (650 mg Oral Given 2/4/21 1220)   nitroglycerin (NITRO-BID) 2 % ointment 0.5 inch (0.5 inches Topical Given 2/4/21 1220)     Patient was evaluated due to having intermittent chest pain for years and reportedly wanting to follow-up but never having a ride. She does intermittently take nitro and states that helps with her pain.   She does have a headache although states it is typical following her nitro and not the worst of her life. Story not suggestive of meningitis or subarachnoid hemorrhage and she has no nuchal rigidity. She did appear anxious in the room and states she has been under a lot of stress and has intermittent suicidal thoughts although no active plan. Troponin was negative and at this time with her symptoms going on for years, I have low suspicion for acute coronary syndrome although I do feel that she could benefit from outpatient assessment by cardiology with stress test and/or coronary angiogram and therefore I gave her another referral for this and she states she will try to get this done. Pain did improve in the emergency department. She continued to have report of suicidal thoughts when I spoke to her although denied any active plan. I did have psychiatry speak to her with her to be safe in order to see if they were worried about her potentially needing inpatient psychiatric assessment. They ultimately cleared her from a psychiatric standpoint and felt she was safe for discharge with outpatient follow-up as she already has arranged. The patient is aware that if her chest pain worsens or she develops worsening headache or increasing suicidal thoughts with plan, then come back to the emergency department immediately, but otherwise follow-up with her primary doctor in regards to her chronic chest discomfort and headache and follow-up with her psychiatrist in regards to her chronic suicidal thoughts and depression. She was well-appearing and in no acute distress at time of discharge and felt comfortable with this plan. I do believe the significant component of her symptoms are related to anxiety in regards to the chest discomfort but she would benefit from further cardiac assessment to be safe. The patient tolerated their visit well.    The patient and / or the family were informed of the results of any tests, a time was given to answer questions. FINAL IMPRESSION      1. Chest pain, unspecified type    2. Flu-like symptoms    3. Cough    4. Encounter for laboratory testing for COVID-19 virus    5. Suicidal thoughts    6. Passive suicidal ideations    7. Anxiety state    8. Acute nonintractable headache, unspecified headache type    9.  Anemia, unspecified type          DISPOSITION/PLAN   DISPOSITION  -discharged in improved, stable condition      PATIENT REFERRED TO:  [facility] Emergency Department  [facility] [address]  Go to   If symptoms worsen    Christel Pierre PA-C  [address]   [phone]    In 2 days      [facility]  [address] [place], [place] [address]a. [place] [address]  [phone]    Schedule an appointment as soon as possible for a visit in 2 days        DISCHARGEMEDICATIONS:  Discharge Medication List as of 2/4/2021  4:05 PM          DISCONTINUED MEDICATIONS:  Discharge Medication List as of 2/4/2021  4:05 PM                 (Please note that portions of this note were completed with a voicerecognition program.  Efforts were made to edit the dictations but occasionally words are mis-transcribed.)    Elyse Ghotra MD (electronically signed)            Elyse Ghotra MD  02/05/21 5866
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
## 2021-02-04 NOTE — BH NOTE
Level of Care Disposition: Discharge  Patient was seen by ED provider and [facility] AN AFFILIATE OF [facility] staff. The case was presented to psychiatric provider on-call Dr. Finn Zarate. Based on the ED evaluation and information presented to the provider by Missy Muñoz RN the decision was made to discharge patient with the following referrals: crisis line, [facility]      RATIONALE FOR NON-ADMISSION:  The patient does not meet criteria for an involuntary psychiatric admission because patient does not present as a threat to self or others. Patient has demonstrated a reasonable safety plan going home to be with therapy animals, thrive worker to come tomorrow.

## 2021-02-05 ENCOUNTER — CARE COORDINATION (OUTPATIENT)
Dept: CARE COORDINATION | Age: 51
End: 2021-02-05

## 2021-02-05 LAB — SARS-COV-2: NOT DETECTED

## 2021-02-05 NOTE — CARE COORDINATION
Outreach call made and no answer. Unable to leave a message with [facility] contact information due to a full voice mailbox. [facility] will outreach at another time.

## 2021-03-02 PROBLEM — R07.2 PRECORDIAL PAIN: Status: ACTIVE | Noted: 2021-03-02

## 2021-03-02 NOTE — PROGRESS NOTES
ENDOSCOPY    TONSILLECTOMY      UPPER GASTROINTESTINAL ENDOSCOPY N/A 6/2/2020    EGD BIOPSY performed by Adrian Smith MD at [facility] ENDOSCOPY       Objective:   /80   Pulse 88   Temp 98.9 °F (37.2 °C)   Ht 5' (1.524 m)   Wt 119 lb (54 kg)   LMP 02/04/2021   SpO2 99%   BMI 23.24 kg/m²     Wt Readings from Last 3 Encounters:   03/04/21 119 lb (54 kg)   02/04/21 120 lb (54.4 kg)   09/20/20 110 lb (49.9 kg)       Physical Exam:  General: No Respiratory distress, appears well developed and well nourished. Eyes:  Sclera nonicteric  Nose/Sinuses:  negative findings: nose shows no deformity, asymmetry, or inflammation, nasal mucosa normal, septum midline with no perforation or bleeding  Back:  no pain to palpation  Joint:  no active joint inflammation  Musculoskeletal:  negative  Skin:  Warm and dry  Neck:  Negative for JVD and Carotid Bruits. Chest:  Clear to auscultation, respiration easy  Cardiovascular:  RRR, S1S2 normal, no murmur, no rub or thrill. Extremities:   No edema, clubbing, cyanosis,  Pulses:  pedal pulses are normal.  Neuro: intact    Medications:   Outpatient Encounter Medications as of 3/4/2021   Medication Sig Dispense Refill    Nitroglycerin (NITRO-TIME PO) Take by mouth      divalproex (DEPAKOTE) 500 MG DR tablet Take 500 mg by mouth 3 times daily      gabapentin (NEURONTIN) 400 MG capsule Take 800 mg by mouth 3 times daily.       benzonatate (TESSALON PERLES) 100 MG capsule Take 1 capsule by mouth 3 times daily as needed for Cough 15 capsule 0    albuterol sulfate HFA (PROVENTIL HFA) 108 (90 Base) MCG/ACT inhaler Inhale 2 puffs into the lungs every 6 hours as needed for Wheezing (with spacer) 1 Inhaler 0    omeprazole (PRILOSEC) 20 MG delayed release capsule Take 20 mg by mouth daily      fluticasone (FLONASE) 50 MCG/ACT nasal spray by Nasal route daily as needed       Meclizine HCl (ANTIVERT PO) Take 12.5 mg by mouth 3 times daily as needed      cyclobenzaprine stress test     1. Will check stress echo  2. Scheduled return visit. Prn pending test results  3. No med changes today       QUALITY MEASURES  1. Tobacco Cessation Counseling: NA  2. Retake of BP if >140/90:   NA  3. Documentation to PCP/referring for new patient:  Sent to PCP at close of office visit  4. CAD patient on anti-platelet: NA  5. CAD patient on STATIN therapy:  NA  6. Patient with CHF and aFib on anticoagulation:  NA     This note was scribed in the presence of  Jose Perez MD by Yang Encarnacion RN  I, Dr. Jose Perez, personally performed the services described in this documentation, as scribed by the above signed scribe in my presence. It is both accurate and complete to my knowledge. I agree with the details independently gathered by the clinical support staff, while the remaining scribed note accurately describes my personal service to the patient.       [address] [place], MD 3/4/2021 4:44 PM

## 2021-03-04 ENCOUNTER — TELEPHONE (OUTPATIENT)
Dept: CARDIOLOGY CLINIC | Age: 51
End: 2021-03-04

## 2021-03-04 ENCOUNTER — OFFICE VISIT (OUTPATIENT)
Dept: CARDIOLOGY CLINIC | Age: 51
End: 2021-03-04
Payer: MEDICARE

## 2021-03-04 VITALS
BODY MASS INDEX: 23.36 KG/M2 | TEMPERATURE: 98.9 F | WEIGHT: 119 LBS | SYSTOLIC BLOOD PRESSURE: 110 MMHG | HEART RATE: 88 BPM | OXYGEN SATURATION: 99 % | DIASTOLIC BLOOD PRESSURE: 80 MMHG | HEIGHT: 60 IN

## 2021-03-04 DIAGNOSIS — R07.2 PRECORDIAL PAIN: Primary | ICD-10-CM

## 2021-03-04 DIAGNOSIS — R42 DIZZINESS: ICD-10-CM

## 2021-03-04 PROCEDURE — 99204 OFFICE O/P NEW MOD 45 MIN: CPT | Performed by: INTERNAL MEDICINE

## 2021-03-04 PROCEDURE — G8420 CALC BMI NORM PARAMETERS: HCPCS | Performed by: INTERNAL MEDICINE

## 2021-03-04 PROCEDURE — G8427 DOCREV CUR MEDS BY ELIG CLIN: HCPCS | Performed by: INTERNAL MEDICINE

## 2021-03-04 PROCEDURE — G8484 FLU IMMUNIZE NO ADMIN: HCPCS | Performed by: INTERNAL MEDICINE

## 2021-03-04 NOTE — PATIENT INSTRUCTIONS
Plan:  Orders Placed This Encounter   Procedures    Echo stress test     1. Will check stress echo  2. Scheduled return visit. Prn pending test results  3.  No med changes today

## 2021-03-04 NOTE — TELEPHONE ENCOUNTER
Pt called the office and sts that she had not heard anything back from her transportation set up thru [employer]. [place] had faxed over the appt confirmation paper to the company on 2/18/21. I reached out to [address] [place] [address] and spoke to them and they state that they have tried to reach the pt but her phone rings busy and is not able to leave a VM. The pt is due for a 1pm  time today, I tried to reach out to the pt to let her know this information and I was not able to LVM due to it not being set up. Pt has a 215 w/RKG today.

## 2021-05-28 ENCOUNTER — HOSPITAL ENCOUNTER (OUTPATIENT)
Dept: NON INVASIVE DIAGNOSTICS | Age: 51
Discharge: HOME | End: 2021-05-28
Payer: MEDICARE

## 2021-05-28 DIAGNOSIS — R07.2 PRECORDIAL PAIN: ICD-10-CM

## 2021-05-28 DIAGNOSIS — R42 DIZZINESS: ICD-10-CM

## 2021-05-28 LAB
LV EF: 60 %
LVEF MODALITY: NORMAL

## 2021-05-28 PROCEDURE — 93351 STRESS TTE COMPLETE: CPT

## 2021-06-11 ENCOUNTER — TELEPHONE (OUTPATIENT)
Dept: CARDIOLOGY CLINIC | Age: 51
End: 2021-06-11
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
## 2021-08-02 ENCOUNTER — HOSPITAL ENCOUNTER (OUTPATIENT)
Age: 51
Discharge: HOME | End: 2021-08-02
Payer: MEDICARE

## 2021-08-02 ENCOUNTER — OFFICE VISIT (OUTPATIENT)
Dept: CARDIOLOGY CLINIC | Age: 51
End: 2021-08-02
Payer: MEDICARE

## 2021-08-02 VITALS
HEIGHT: 60 IN | WEIGHT: 120.8 LBS | TEMPERATURE: 98.2 F | DIASTOLIC BLOOD PRESSURE: 80 MMHG | BODY MASS INDEX: 23.71 KG/M2 | OXYGEN SATURATION: 100 % | SYSTOLIC BLOOD PRESSURE: 124 MMHG | HEART RATE: 80 BPM

## 2021-08-02 DIAGNOSIS — R42 DIZZINESS AND GIDDINESS: ICD-10-CM

## 2021-08-02 DIAGNOSIS — R11.2 NAUSEA AND VOMITING, INTRACTABILITY OF VOMITING NOT SPECIFIED, UNSPECIFIED VOMITING TYPE: ICD-10-CM

## 2021-08-02 DIAGNOSIS — R07.2 PRECORDIAL PAIN: Primary | ICD-10-CM

## 2021-08-02 DIAGNOSIS — R53.82 CHRONIC FATIGUE: ICD-10-CM

## 2021-08-02 LAB
A/G RATIO: 1.5 (ref 1.1–2.2)
ALBUMIN SERPL-MCNC: 4.5 G/DL (ref 3.4–5)
ALP BLD-CCNC: 51 U/L (ref 40–129)
ALT SERPL-CCNC: 10 U/L (ref 10–40)
ANION GAP SERPL CALCULATED.3IONS-SCNC: 13 MMOL/L (ref 3–16)
AST SERPL-CCNC: 13 U/L (ref 15–37)
BACTERIA: ABNORMAL /HPF
BASOPHILS ABSOLUTE: 0.1 K/UL (ref 0–0.2)
BASOPHILS RELATIVE PERCENT: 0.7 %
BILIRUB SERPL-MCNC: 0.4 MG/DL (ref 0–1)
BILIRUBIN URINE: NEGATIVE
BLOOD, URINE: ABNORMAL
BUN BLDV-MCNC: 10 MG/DL (ref 7–20)
C-REACTIVE PROTEIN: <3 MG/L (ref 0–5.1)
CALCIUM SERPL-MCNC: 9.3 MG/DL (ref 8.3–10.6)
CHLORIDE BLD-SCNC: 104 MMOL/L (ref 99–110)
CLARITY: CLEAR
CO2: 21 MMOL/L (ref 21–32)
COLOR: YELLOW
CREAT SERPL-MCNC: 0.6 MG/DL (ref 0.6–1.1)
EOSINOPHILS ABSOLUTE: 0.1 K/UL (ref 0–0.6)
EOSINOPHILS RELATIVE PERCENT: 1.6 %
EPITHELIAL CELLS, UA: ABNORMAL /HPF (ref 0–5)
GFR AFRICAN AMERICAN: >60
GFR NON-AFRICAN AMERICAN: >60
GLOBULIN: 3.1 G/DL
GLUCOSE BLD-MCNC: 89 MG/DL (ref 70–99)
GLUCOSE URINE: NEGATIVE MG/DL
HCT VFR BLD CALC: 33.9 % (ref 36–48)
HEMOGLOBIN: 11.1 G/DL (ref 12–16)
KETONES, URINE: NEGATIVE MG/DL
LEUKOCYTE ESTERASE, URINE: ABNORMAL
LYMPHOCYTES ABSOLUTE: 2.1 K/UL (ref 1–5.1)
LYMPHOCYTES RELATIVE PERCENT: 28.3 %
MCH RBC QN AUTO: 25.8 PG (ref 26–34)
MCHC RBC AUTO-ENTMCNC: 32.7 G/DL (ref 31–36)
MCV RBC AUTO: 79 FL (ref 80–100)
MICROSCOPIC EXAMINATION: YES
MONOCYTES ABSOLUTE: 0.5 K/UL (ref 0–1.3)
MONOCYTES RELATIVE PERCENT: 6.4 %
MUCUS: ABNORMAL /LPF
NEUTROPHILS ABSOLUTE: 4.8 K/UL (ref 1.7–7.7)
NEUTROPHILS RELATIVE PERCENT: 63 %
NITRITE, URINE: NEGATIVE
PDW BLD-RTO: 19.5 % (ref 12.4–15.4)
PH UA: 5.5 (ref 5–8)
PLATELET # BLD: 273 K/UL (ref 135–450)
PMV BLD AUTO: 8.5 FL (ref 5–10.5)
POTASSIUM SERPL-SCNC: 3.7 MMOL/L (ref 3.5–5.1)
PROTEIN UA: NEGATIVE MG/DL
RBC # BLD: 4.3 M/UL (ref 4–5.2)
RBC UA: ABNORMAL /HPF (ref 0–4)
SEDIMENTATION RATE, ERYTHROCYTE: 22 MM/HR (ref 0–20)
SODIUM BLD-SCNC: 138 MMOL/L (ref 136–145)
SPECIFIC GRAVITY UA: 1.02 (ref 1–1.03)
T4 TOTAL: 6 UG/DL (ref 4.5–10.9)
TOTAL CK: 87 U/L (ref 26–192)
TOTAL PROTEIN: 7.6 G/DL (ref 6.4–8.2)
TSH SERPL DL<=0.05 MIU/L-ACNC: 1.3 UIU/ML (ref 0.27–4.2)
URINE TYPE: ABNORMAL
UROBILINOGEN, URINE: 0.2 E.U./DL
VITAMIN D 25-HYDROXY: 21.7 NG/ML
WBC # BLD: 7.6 K/UL (ref 4–11)
WBC UA: ABNORMAL /HPF (ref 0–5)

## 2021-08-02 PROCEDURE — 36415 COLL VENOUS BLD VENIPUNCTURE: CPT

## 2021-08-02 PROCEDURE — 86140 C-REACTIVE PROTEIN: CPT

## 2021-08-02 PROCEDURE — 85025 COMPLETE CBC W/AUTO DIFF WBC: CPT

## 2021-08-02 PROCEDURE — 85652 RBC SED RATE AUTOMATED: CPT

## 2021-08-02 PROCEDURE — 86720 LEPTOSPIRA ANTIBODY: CPT

## 2021-08-02 PROCEDURE — 81001 URINALYSIS AUTO W/SCOPE: CPT

## 2021-08-02 PROCEDURE — G8420 CALC BMI NORM PARAMETERS: HCPCS | Performed by: INTERNAL MEDICINE

## 2021-08-02 PROCEDURE — 86617 LYME DISEASE ANTIBODY: CPT

## 2021-08-02 PROCEDURE — 80053 COMPREHEN METABOLIC PANEL: CPT

## 2021-08-02 PROCEDURE — 84443 ASSAY THYROID STIM HORMONE: CPT

## 2021-08-02 PROCEDURE — 82550 ASSAY OF CK (CPK): CPT

## 2021-08-02 PROCEDURE — 82085 ASSAY OF ALDOLASE: CPT

## 2021-08-02 PROCEDURE — G8427 DOCREV CUR MEDS BY ELIG CLIN: HCPCS | Performed by: INTERNAL MEDICINE

## 2021-08-02 PROCEDURE — 99214 OFFICE O/P EST MOD 30 MIN: CPT | Performed by: INTERNAL MEDICINE

## 2021-08-02 PROCEDURE — 82306 VITAMIN D 25 HYDROXY: CPT

## 2021-08-02 PROCEDURE — 84436 ASSAY OF TOTAL THYROXINE: CPT

## 2021-08-02 PROCEDURE — 1036F TOBACCO NON-USER: CPT | Performed by: INTERNAL MEDICINE

## 2021-08-02 RX ORDER — ISOSORBIDE MONONITRATE 30 MG/1
30 TABLET, EXTENDED RELEASE ORAL DAILY
Qty: 30 TABLET | Refills: 11 | Status: SHIPPED | OUTPATIENT
Start: 2021-08-02 | End: 2021-09-15

## 2021-08-02 RX ORDER — B-COMPLEX WITH VITAMIN C
1 TABLET ORAL
COMMUNITY
End: 2021-08-02 | Stop reason: ALTCHOICE

## 2021-08-02 RX ORDER — SUCRALFATE 1 G/1
TABLET ORAL
COMMUNITY
Start: 2021-07-30

## 2021-08-02 NOTE — PATIENT INSTRUCTIONS
Plan:  Orders Placed This Encounter   Procedures    US ABDOMEN COMPLETE     1. Will add Imdur 30 mg 1 daily to improve blood flow to heart arteries   2. Will check lipid panel  3.  do not start Imdur until you have pregnancy test  4.  Follow up in 3 months

## 2021-08-04 LAB
ALDOLASE: 1.8 U/L (ref 1.5–8.1)
LYME (B. BURGDORFERI) AB IGG WB: NEGATIVE
LYME AB IGM BY WB:: NEGATIVE

## 2021-08-05 LAB — LEPTOSPIRA AB, IGM: NEGATIVE

## 2021-08-16 ENCOUNTER — HOSPITAL ENCOUNTER (OUTPATIENT)
Age: 51
Discharge: HOME | End: 2021-08-16
Payer: MEDICARE

## 2021-08-16 DIAGNOSIS — R07.2 PRECORDIAL PAIN: ICD-10-CM

## 2021-08-16 DIAGNOSIS — R53.82 CHRONIC FATIGUE: ICD-10-CM

## 2021-08-16 LAB
BASOPHILS ABSOLUTE: 0 K/UL (ref 0–0.2)
BASOPHILS RELATIVE PERCENT: 0.6 %
CHOLESTEROL, FASTING: 141 MG/DL (ref 0–199)
EOSINOPHILS ABSOLUTE: 0.1 K/UL (ref 0–0.6)
EOSINOPHILS RELATIVE PERCENT: 2.1 %
HCT VFR BLD CALC: 33.2 % (ref 36–48)
HDLC SERPL-MCNC: 64 MG/DL (ref 40–60)
HEMOGLOBIN: 10.7 G/DL (ref 12–16)
LDL CHOLESTEROL CALCULATED: 62 MG/DL
LYMPHOCYTES ABSOLUTE: 1.8 K/UL (ref 1–5.1)
LYMPHOCYTES RELATIVE PERCENT: 34 %
MCH RBC QN AUTO: 25.9 PG (ref 26–34)
MCHC RBC AUTO-ENTMCNC: 32.2 G/DL (ref 31–36)
MCV RBC AUTO: 80.3 FL (ref 80–100)
MONOCYTES ABSOLUTE: 0.4 K/UL (ref 0–1.3)
MONOCYTES RELATIVE PERCENT: 8.4 %
NEUTROPHILS ABSOLUTE: 2.9 K/UL (ref 1.7–7.7)
NEUTROPHILS RELATIVE PERCENT: 54.9 %
PDW BLD-RTO: 19.9 % (ref 12.4–15.4)
PLATELET # BLD: 338 K/UL (ref 135–450)
PMV BLD AUTO: 7.2 FL (ref 5–10.5)
RBC # BLD: 4.14 M/UL (ref 4–5.2)
TRIGLYCERIDE, FASTING: 77 MG/DL (ref 0–150)
VLDLC SERPL CALC-MCNC: 15 MG/DL
WBC # BLD: 5.3 K/UL (ref 4–11)

## 2021-08-16 PROCEDURE — 85025 COMPLETE CBC W/AUTO DIFF WBC: CPT

## 2021-08-16 PROCEDURE — 80061 LIPID PANEL: CPT

## 2021-08-16 PROCEDURE — 36415 COLL VENOUS BLD VENIPUNCTURE: CPT

## 2021-08-17 ENCOUNTER — TELEPHONE (OUTPATIENT)
Dept: CARDIOLOGY CLINIC | Age: 51
End: 2021-08-17

## 2021-08-17 NOTE — TELEPHONE ENCOUNTER
----- Message from Yulia Carrillo MD sent at 8/17/2021  8:07 AM EDT -----  Cholesterol test is good  she however remains anemic.   She should follow up with Marianna Garg PA-C

## 2021-08-17 NOTE — TELEPHONE ENCOUNTER
Attempted to contact pt, vm does not allow messages to be left. Will try to contact at another time.

## 2021-08-19 ENCOUNTER — HOSPITAL ENCOUNTER (OUTPATIENT)
Dept: ULTRASOUND IMAGING | Age: 51
Discharge: HOME | End: 2021-08-19
Payer: MEDICARE

## 2021-08-19 DIAGNOSIS — R11.2 NAUSEA AND VOMITING, INTRACTABILITY OF VOMITING NOT SPECIFIED, UNSPECIFIED VOMITING TYPE: ICD-10-CM

## 2021-08-19 DIAGNOSIS — R07.2 PRECORDIAL PAIN: ICD-10-CM

## 2021-08-19 PROCEDURE — 76705 ECHO EXAM OF ABDOMEN: CPT

## 2021-09-15 RX ORDER — ISOSORBIDE MONONITRATE 30 MG/1
TABLET, EXTENDED RELEASE ORAL
Qty: 30 TABLET | Refills: 11 | Status: SHIPPED | OUTPATIENT
Start: 2021-09-15 | End: 2022-09-06 | Stop reason: SDUPTHER

## 2021-11-04 ENCOUNTER — OFFICE VISIT (OUTPATIENT)
Dept: CARDIOLOGY CLINIC | Age: 51
End: 2021-11-04
Payer: MEDICARE

## 2021-11-04 VITALS
HEART RATE: 100 BPM | SYSTOLIC BLOOD PRESSURE: 130 MMHG | BODY MASS INDEX: 24.54 KG/M2 | OXYGEN SATURATION: 99 % | DIASTOLIC BLOOD PRESSURE: 80 MMHG | WEIGHT: 125 LBS | HEIGHT: 60 IN | TEMPERATURE: 98.2 F

## 2021-11-04 DIAGNOSIS — I20.9 ANGINA PECTORIS (HCC): Primary | ICD-10-CM

## 2021-11-04 DIAGNOSIS — R07.2 PRECORDIAL PAIN: ICD-10-CM

## 2021-11-04 PROCEDURE — G8427 DOCREV CUR MEDS BY ELIG CLIN: HCPCS | Performed by: INTERNAL MEDICINE

## 2021-11-04 PROCEDURE — 1036F TOBACCO NON-USER: CPT | Performed by: INTERNAL MEDICINE

## 2021-11-04 PROCEDURE — G8420 CALC BMI NORM PARAMETERS: HCPCS | Performed by: INTERNAL MEDICINE

## 2021-11-04 PROCEDURE — 99214 OFFICE O/P EST MOD 30 MIN: CPT | Performed by: INTERNAL MEDICINE

## 2021-11-04 PROCEDURE — G8484 FLU IMMUNIZE NO ADMIN: HCPCS | Performed by: INTERNAL MEDICINE

## 2021-11-04 RX ORDER — METOPROLOL SUCCINATE 25 MG/1
25 TABLET, EXTENDED RELEASE ORAL DAILY
Qty: 30 TABLET | Refills: 11 | Status: SHIPPED | OUTPATIENT
Start: 2021-11-04 | End: 2021-12-07

## 2021-11-04 RX ORDER — NITROGLYCERIN 0.4 MG/1
0.4 TABLET SUBLINGUAL EVERY 5 MIN PRN
Qty: 25 TABLET | Refills: 3 | Status: SHIPPED | OUTPATIENT
Start: 2021-11-04 | End: 2021-12-01

## 2021-11-04 NOTE — PATIENT INSTRUCTIONS
1. Medications reviewed  2. Add Toprol XL 25 daily to help decrease heart rate  3. Refilled Nitro SL for chest pain to use as needed  4. Follow up in a year  5.  Recommend COVID vaccine

## 2021-11-04 NOTE — PROGRESS NOTES
[facility] Office Note  2021   Ref By Leona Parks PA-C  Subjective:  Ms. Micha Vail is being seen for evaluation for chest pain. Normal stress echo but chest pain responding to nitro  Imdur greatly reduced the frequency of CP only two episodes since last 3 months both relieved by SL nitro. She is undergoing testing for esophagus   GB ultasound neg for stones. HPI:   Today, she reports that she is stressed. She has EGD scheduled for 2021 for stomach issues. Isosorbide is helping chest pain. She just started a new GI medications. PMH  Ms Micha Vail 54 y.o. female with PMH GERD, Gastric ulcer, COPD, depression, anxiety, anemia, bipolar  Seen in ER 21 for chest pain MI ruled out and DC home    Review of Systems:         12 point ROS negative in all areas as listed below except as in [place]  Constitutional, EENT,  pulmonary, GI, , Musculoskeletal, skin, neurological, hematological, endocrine, Psychiatric- anxiety     Reviewed past medical history, social, and family history.    Non smoker around 2nd hand smoke, occasional alcohol  Adopted Mom  MI in her sleep age 78 MGF  MI   Past Medical History:   Diagnosis Date    ADHD     Allergic rhinitis     Anemia     Anxiety     Arthritis     Asthma     Bipolar 1 disorder (HCC)     Chicken pox     as a child    [facility]chronic obstructive [facility] disease) ([facility] [address]     Depression     GERD (gastroesophageal reflux disease)     PTSD (post-traumatic stress disorder)      Past Surgical History:   Procedure Laterality Date    APPENDECTOMY       SECTION      x 3    COLONOSCOPY N/A 2020    COLONOSCOPY DIAGNOSTIC performed by [facility]uhn MD at [facility] N/A 2020    EGD BIOPSY performed by Kimmie Kuhn MD at [address] [place] [address]       Objective:   /80   Pulse 100   Temp 98.2 °F (36.8 °C)   Ht 5' (1.524 m)   Wt 125 lb (56.7 kg) SpO2 99%   BMI 24.41 kg/m²     Wt Readings from Last 3 Encounters:   11/04/21 125 lb (56.7 kg)   08/02/21 120 lb 12.8 oz (54.8 kg)   03/04/21 119 lb (54 kg)       Physical Exam:  General: No Respiratory distress, appears well developed and well nourished. Eyes:  Sclera nonicteric  Nose/Sinuses:  negative findings: nose shows no deformity, asymmetry, or inflammation, nasal mucosa normal, septum midline with no perforation or bleeding  Back:  no pain to palpation  Joint:  no active joint inflammation  Musculoskeletal:  negative  Skin:  Warm and dry  Neck:  Negative for JVD and Carotid Bruits. Chest:  Clear to auscultation, respiration easy  Cardiovascular:  RRR, S1S2 normal, no murmur, no rub or thrill. Extremities:   No edema, clubbing, cyanosis,  Pulses:  pedal pulses are normal.  Neuro: intact    Medications:   Outpatient Encounter Medications as of 11/4/2021   Medication Sig Dispense Refill    metoprolol succinate (TOPROL XL) 25 MG extended release tablet Take 1 tablet by mouth daily 30 tablet 11    nitroGLYCERIN (NITROSTAT) 0.4 MG SL tablet Place 1 tablet under the tongue every 5 minutes as needed for Chest pain 25 tablet 3    isosorbide mononitrate (IMDUR) 30 MG extended release tablet TAKE 1 TABLET BY MOUTH EVERY DAY 30 tablet 11    sucralfate (CARAFATE) 1 GM tablet TAKE 1 TABLET BY MOUTH 4 TIMES A DAY AS NEEDED FOR STOMACH      Nitroglycerin (NITRO-TIME PO) Take by mouth      gabapentin (NEURONTIN) 400 MG capsule Take 800 mg by mouth 3 times daily.       albuterol sulfate HFA (PROVENTIL HFA) 108 (90 Base) MCG/ACT inhaler Inhale 2 puffs into the lungs every 6 hours as needed for Wheezing (with spacer) 1 Inhaler 0    omeprazole (PRILOSEC) 20 MG delayed release capsule Take 20 mg by mouth daily      fluticasone (FLONASE) 50 MCG/ACT nasal spray by Nasal route daily as needed       Meclizine HCl (ANTIVERT PO) Take 12.5 mg by mouth 3 times daily as needed      cyclobenzaprine (FLEXERIL) 10 MG tablet Take 10 mg by mouth 3 times daily as needed for Muscle spasms      calcium carbonate (OSCAL) 500 MG TABS tablet Take 1,000 mg by mouth daily      docusate sodium (COLACE) 100 MG capsule Take 100 mg by mouth 2 times daily as needed       Ferrous Sulfate (IRON) 325 (65 FE) MG TABS Take 325 mg by mouth 2 times daily. 60 tablet 2     No facility-administered encounter medications on file as of 11/4/2021. Lab Data:  CBC: No results for input(s): WBC, HGB, HCT, MCV, PLT in the last 72 hours. BMP: No results for input(s): NA, K, CL, CO2, PHOS, BUN, CREATININE in the last 72 hours. Invalid input(s): CA  LIVER PROFILE: No results for input(s): AST, ALT, LIPASE, BILIDIR, BILITOT, ALKPHOS in the last 72 hours. Invalid input(s): AMYLASE,  ALB  LIPID: No results found for: CHOL  No results found for: TRIG  Lab Results   Component Value Date    HDL 64 (H) 08/16/2021     Lab Results   Component Value Date    LDLCALC 62 08/16/2021     Lab Results   Component Value Date    LABVLDL 15 08/16/2021     No results found for: CHOLHDLRATIO  PT/INR: No results for input(s): PROTIME, INR in the last 72 hours. A1C: No results found for: LABA1C  BNP:  No results for input(s): BNP in the last 72 hours. IMAGING:   I have reviewed the following tests and documented in this encounter as follows:   Discussed with patient  Echo stress test 5/28/21  Summary  baseline resting EKG has normal sinus rhythm. EKG is within normal limits. Patient exercised on treadmill according to standard [place] protocol for 8:43  minutes. Peak heart rate achieved is 146/min, 85% of predicted maximum. No cardiac arrhythmia. No symptoms reported. No EKG changes of stress induced left ventricular ischemia. Images obtained in 63 seconds. Baseline resting echocardiogram shows normal global LV systolic function  with an ejection fraction of 60% and uniform myocardial segmental wall  motion.  Following stress there was uniform augmentation of all

## 2021-12-01 NOTE — PROGRESS NOTES
[facility] Preoperative Screening for Elective Surgery/Invasive Procedures While COVID-19 present in the community     Have you had any of the following symptoms? o Fever, chills  o Cough  o Shortness of breath  o Muscle aches/pain  o Diarrhea  o Abdominal pain, nausea, vomiting  o Loss or decrease in taste and / or smell   Risk of Exposure  o Have you recently been hospitalized for COVID-19 or flu-like illness, if so when?  o Recently diagnosed with COVID-19, if so when?  o Recently tested for COVID-19, if so when?  o Have you been in close contact with a person or family member who currently has or recently had COVID-19? If yes, when and in what context?  o Do you live with anybody who in the last 14 days has had fever, chills, shortness of breath, muscle aches, flu-like illness?  o Do you have any close contacts or family members who are currently in the hospital for COVID-19 or flu-like illness? If yes, assess recent close contact with this person. Indicate if the patient has a positive screen by answering yes to one or more of the above questions. Patients who test positive or screen positive prior to surgery or on the day of surgery should be evaluated in conjunction with the surgeon/proceduralist/anesthesiologist to determine the urgency of the procedure.

## 2021-12-01 NOTE — PROGRESS NOTES
.1.  Do not eat or drink anything after 12 midnight prior to surgery. This includes no water, chewing gum or mints. 2.  Take the following pills with a small sip of water on the morning of surgery 12/08/2021.  3.  Aspirin, Ibuprofen, Advil, Naproxen, Vitamin E and other Anti-inflammatory products should be stopped for 5 days before surgery or as directed by your physician. 4.  Check with your doctor regarding stopping Plavix, Coumadin, Lovenox, Fragmin or other blood thinners. 5.  Do not smoke and do not drink alcoholic beverages 24 hours prior to surgery. This includes NA Beer. 6.  You may brush your teeth and gargle the morning of surgery. DO NOT SWALLOW WATER.  7.  You MUST make arrangements for a responsible adult to take you home after your surgery. You will not be allowed to leave alone or drive yourself home. It is strongly suggested someone stay with you the first 24 hours. Your surgery will be cancelled if you do not have a ride home. 8.  A parent/legal guardian must accompany a child scheduled for surgery and plan to stay at the hospital until the child is discharged. Please do not bring other children with you. 9.  Please wear simple, loose fitting clothing to the hospital.  Jc Jackson not bring valuables ( money, credit cards, checkbooks, etc.)  Do not wear any makeup (including no eye makeup) or nail polish on your fingers or toes. 10.  Do not wear any jewelry or piercing on the day of surgery. All body piercing jewelry must be removed. 11.  If you have dentures, they will be removed before going to the OR; we will provide you a container. If you wear contact lenses or glasses, they will be removed; please bring a case for them. 12.  Please see your family doctor/pediatrician for a history & physical and/or concerning medications. Bring any test results/reports from your physician's office the day of surgery.     13.  Remember to bring Blood Bank Bracelet to the hospital on the day of

## 2021-12-07 ENCOUNTER — ANESTHESIA EVENT (OUTPATIENT)
Dept: ENDOSCOPY | Age: 51
End: 2021-12-07
Payer: MEDICARE

## 2021-12-07 RX ORDER — METOPROLOL SUCCINATE 25 MG/1
TABLET, EXTENDED RELEASE ORAL
Qty: 90 TABLET | Refills: 3 | Status: SHIPPED | OUTPATIENT
Start: 2021-12-07 | End: 2022-09-06 | Stop reason: SDUPTHER

## 2021-12-08 ENCOUNTER — HOSPITAL ENCOUNTER (OUTPATIENT)
Age: 51
Setting detail: OUTPATIENT SURGERY
Discharge: HOME | End: 2021-12-08
Attending: INTERNAL MEDICINE | Admitting: INTERNAL MEDICINE
Payer: MEDICARE

## 2021-12-08 ENCOUNTER — ANESTHESIA (OUTPATIENT)
Dept: ENDOSCOPY | Age: 51
End: 2021-12-08
Payer: MEDICARE

## 2021-12-08 VITALS
DIASTOLIC BLOOD PRESSURE: 64 MMHG | RESPIRATION RATE: 21 BRPM | OXYGEN SATURATION: 100 % | SYSTOLIC BLOOD PRESSURE: 114 MMHG

## 2021-12-08 VITALS
DIASTOLIC BLOOD PRESSURE: 60 MMHG | SYSTOLIC BLOOD PRESSURE: 112 MMHG | HEART RATE: 80 BPM | WEIGHT: 120 LBS | TEMPERATURE: 97.2 F | OXYGEN SATURATION: 98 % | RESPIRATION RATE: 14 BRPM | BODY MASS INDEX: 23.56 KG/M2 | HEIGHT: 60 IN

## 2021-12-08 LAB — PREGNANCY, URINE: NEGATIVE

## 2021-12-08 PROCEDURE — 7100000011 HC PHASE II RECOVERY - ADDTL 15 MIN: Performed by: INTERNAL MEDICINE

## 2021-12-08 PROCEDURE — 6360000002 HC RX W HCPCS: Performed by: NURSE ANESTHETIST, CERTIFIED REGISTERED

## 2021-12-08 PROCEDURE — 3609012400 HC EGD TRANSORAL BIOPSY SINGLE/MULTIPLE: Performed by: INTERNAL MEDICINE

## 2021-12-08 PROCEDURE — 84703 CHORIONIC GONADOTROPIN ASSAY: CPT

## 2021-12-08 PROCEDURE — 3700000001 HC ADD 15 MINUTES (ANESTHESIA): Performed by: INTERNAL MEDICINE

## 2021-12-08 PROCEDURE — 2709999900 HC NON-CHARGEABLE SUPPLY: Performed by: INTERNAL MEDICINE

## 2021-12-08 PROCEDURE — 6370000000 HC RX 637 (ALT 250 FOR IP): Performed by: NURSE ANESTHETIST, CERTIFIED REGISTERED

## 2021-12-08 PROCEDURE — 2580000003 HC RX 258: Performed by: ANESTHESIOLOGY

## 2021-12-08 PROCEDURE — 2500000003 HC RX 250 WO HCPCS: Performed by: NURSE ANESTHETIST, CERTIFIED REGISTERED

## 2021-12-08 PROCEDURE — 88305 TISSUE EXAM BY PATHOLOGIST: CPT

## 2021-12-08 PROCEDURE — 7100000010 HC PHASE II RECOVERY - FIRST 15 MIN: Performed by: INTERNAL MEDICINE

## 2021-12-08 PROCEDURE — 3700000000 HC ANESTHESIA ATTENDED CARE: Performed by: INTERNAL MEDICINE

## 2021-12-08 RX ORDER — ALBUTEROL SULFATE 90 UG/1
AEROSOL, METERED RESPIRATORY (INHALATION) PRN
Status: DISCONTINUED | OUTPATIENT
Start: 2021-12-08 | End: 2021-12-08 | Stop reason: SDUPTHER

## 2021-12-08 RX ORDER — SODIUM CHLORIDE 0.9 % (FLUSH) 0.9 %
10 SYRINGE (ML) INJECTION PRN
Status: DISCONTINUED | OUTPATIENT
Start: 2021-12-08 | End: 2021-12-08 | Stop reason: HOSPADM

## 2021-12-08 RX ORDER — PROPOFOL 10 MG/ML
INJECTION, EMULSION INTRAVENOUS PRN
Status: DISCONTINUED | OUTPATIENT
Start: 2021-12-08 | End: 2021-12-08 | Stop reason: SDUPTHER

## 2021-12-08 RX ORDER — LIDOCAINE HYDROCHLORIDE 20 MG/ML
INJECTION, SOLUTION INFILTRATION; PERINEURAL PRN
Status: DISCONTINUED | OUTPATIENT
Start: 2021-12-08 | End: 2021-12-08 | Stop reason: SDUPTHER

## 2021-12-08 RX ORDER — SODIUM CHLORIDE, SODIUM LACTATE, POTASSIUM CHLORIDE, CALCIUM CHLORIDE 600; 310; 30; 20 MG/100ML; MG/100ML; MG/100ML; MG/100ML
INJECTION, SOLUTION INTRAVENOUS CONTINUOUS
Status: DISCONTINUED | OUTPATIENT
Start: 2021-12-08 | End: 2021-12-08 | Stop reason: HOSPADM

## 2021-12-08 RX ORDER — SODIUM CHLORIDE 9 MG/ML
25 INJECTION, SOLUTION INTRAVENOUS PRN
Status: DISCONTINUED | OUTPATIENT
Start: 2021-12-08 | End: 2021-12-08

## 2021-12-08 RX ORDER — SODIUM CHLORIDE, SODIUM LACTATE, POTASSIUM CHLORIDE, CALCIUM CHLORIDE 600; 310; 30; 20 MG/100ML; MG/100ML; MG/100ML; MG/100ML
INJECTION, SOLUTION INTRAVENOUS CONTINUOUS
Status: DISCONTINUED | OUTPATIENT
Start: 2021-12-08 | End: 2021-12-08 | Stop reason: DRUGHIGH

## 2021-12-08 RX ORDER — LIDOCAINE HYDROCHLORIDE 10 MG/ML
1 INJECTION, SOLUTION EPIDURAL; INFILTRATION; INTRACAUDAL; PERINEURAL
Status: DISCONTINUED | OUTPATIENT
Start: 2021-12-08 | End: 2021-12-08 | Stop reason: HOSPADM

## 2021-12-08 RX ORDER — SODIUM CHLORIDE 0.9 % (FLUSH) 0.9 %
10 SYRINGE (ML) INJECTION EVERY 12 HOURS SCHEDULED
Status: DISCONTINUED | OUTPATIENT
Start: 2021-12-08 | End: 2021-12-08 | Stop reason: HOSPADM

## 2021-12-08 RX ADMIN — Medication 2 PUFF: at 09:18

## 2021-12-08 RX ADMIN — SODIUM CHLORIDE, POTASSIUM CHLORIDE, SODIUM LACTATE AND CALCIUM CHLORIDE: 600; 310; 30; 20 INJECTION, SOLUTION INTRAVENOUS at 09:12

## 2021-12-08 RX ADMIN — PROPOFOL 230 MG: 10 INJECTION, EMULSION INTRAVENOUS at 09:26

## 2021-12-08 RX ADMIN — LIDOCAINE HYDROCHLORIDE 80 MG: 20 INJECTION, SOLUTION INFILTRATION; PERINEURAL at 09:26

## 2021-12-08 ASSESSMENT — PAIN SCALES - GENERAL
PAINLEVEL_OUTOF10: 0

## 2021-12-08 NOTE — H&P
History and Physical / Pre-Sedation Assessment    Patient:  Tuan Jalloh   :   1970     Intended Procedure:  EGD    HPI: 54year old female with a history of ADHD, bipolar disorder, asthma, depression, anxiety, diabetes, arthritis and osteoporosis presents for EGD. She complains of stomach burning, GERD, epigastric abdominal pain, and nausea. She admits to passing black, tarry stool. Her labs reveal anemia    Past Medical History:   Diagnosis Date    ADHD     Allergic rhinitis     Anemia     Anxiety     Arthritis     Asthma     Bipolar 1 disorder (HCC)     Chicken pox     as a child    COPD [facility]ch[facility] obstructive pulmonary disease) ([facility])     Depression     GERD (gastroesophageal reflux disease)     PTSD (post-traumatic stress disorder)      Past Surgical History:   Procedure Laterality Date    APPENDECTOMY       SECTION      x 3    COLONOSCOPY N/A 2020    COLONOSCOPY DIAGNOSTIC performed by Diana [facility]rez [facility] at [facility] N/A 2020    EGD BIOPSY performed by Diana Ramírez MD at [address] [place] [address]       Medications reviewed  Prior to Admission medications    Medication Sig Start Date End Date Taking? Authorizing Provider   metoprolol succinate (TOPROL XL) 25 MG extended release tablet TAKE 1 TABLET BY MOUTH EVERY DAY 21  Yes Rico Hensley MD   sucralfate (CARAFATE) 1 GM tablet TAKE 1 TABLET BY MOUTH 4 TIMES A DAY AS NEEDED FOR STOMACH 21  Yes Historical Provider, MD   gabapentin (NEURONTIN) 400 MG capsule Take 800 mg by mouth 3 times daily.    Yes Historical Provider, MD   albuterol sulfate HFA (PROVENTIL HFA) 108 (90 Base) MCG/ACT inhaler Inhale 2 puffs into the lungs every 6 hours as needed for Wheezing (with spacer) 19  Yes Melony Carrera MD   fluticasone (FLONASE) 50 MCG/ACT nasal spray by Nasal route daily as needed  10/19/18  Yes Historical Provider, MD cyclobenzaprine (FLEXERIL) 10 MG tablet Take 10 mg by mouth 3 times daily as needed for Muscle spasms   Yes Historical Provider, MD   calcium carbonate (OSCAL) 500 MG TABS tablet Take 1,000 mg by mouth daily   Yes Historical Provider, MD   docusate sodium (COLACE) 100 MG capsule Take 100 mg by mouth 2 times daily as needed    Yes Historical Provider, MD   Ferrous Sulfate (IRON) 325 (65 FE) MG TABS Take 325 mg by mouth 2 times daily. 2/28/11  Yes Ronnie Hidalgo MD   isosorbide mononitrate (IMDUR) 30 MG extended release tablet TAKE 1 TABLET BY MOUTH EVERY DAY 9/15/21   Fran Gonzalez MD   Nitroglycerin (NITRO-TIME PO) Take by mouth    Historical Provider, MD        Allergies: No Known Allergies    Nurses notes reviewed and agreed. Physical Exam:  Vital Signs: /80   Pulse 84   Temp 97.5 °F (36.4 °C) (Temporal)   Resp 20   Ht 5' (1.524 m)   Wt 120 lb (54.4 kg)   SpO2 100%   BMI 23.44 kg/m²    Airway: Mallampati: II (soft palate, uvula, fauces visible)  Pulmonary:Normal  Cardiac:Normal  Abdomen:Normal    Pre-Procedure Assessment / Plan:  ASA: Class 3 - A patient with severe systemic disease that limits activity but is not incapacitating  Level of Sedation Plan: Moderate sedation  Post Procedure plan: Return to same level of care    I assessed the patient and find that the patient is in satisfactory condition to proceed with the planned procedure and sedation plan. I have explained the risk, benefits, and alternatives to the procedure; the patient understands and agrees to proceed.        Janeth Ambriz MD  12/8/2021

## 2021-12-08 NOTE — ANESTHESIA PRE PROCEDURE
Department of Anesthesiology  Preprocedure Note       Name:  Meagan Renteria   Age:  54 y.o.  :  1970                                          MRN:  [id number]         Date:  2021      Surgeon: Denver Joseph):  Leann Rivera MD    Procedure: Procedure(s):  EGD W/ANES. (9:30)    Medications prior to admission:   Prior to Admission medications    Medication Sig Start Date End Date Taking? Authorizing Provider   metoprolol succinate (TOPROL XL) 25 MG extended release tablet TAKE 1 TABLET BY MOUTH EVERY DAY 21  Yes Aneudy Mensah MD   sucralfate (CARAFATE) 1 GM tablet TAKE 1 TABLET BY MOUTH 4 TIMES A DAY AS NEEDED FOR STOMACH 21  Yes Historical Provider, MD   gabapentin (NEURONTIN) 400 MG capsule Take 800 mg by mouth 3 times daily. Yes Historical Provider, MD   albuterol sulfate HFA (PROVENTIL HFA) 108 (90 Base) MCG/ACT inhaler Inhale 2 puffs into the lungs every 6 hours as needed for Wheezing (with spacer) 19  Yes Jesica Smith MD   fluticasone (FLONASE) 50 MCG/ACT nasal spray by Nasal route daily as needed  10/19/18  Yes Historical Provider, MD   cyclobenzaprine (FLEXERIL) 10 MG tablet Take 10 mg by mouth 3 times daily as needed for Muscle spasms   Yes Historical Provider, MD   calcium carbonate (OSCAL) 500 MG TABS tablet Take 1,000 mg by mouth daily   Yes Historical Provider, MD   docusate sodium (COLACE) 100 MG capsule Take 100 mg by mouth 2 times daily as needed    Yes Historical Provider, MD   Ferrous Sulfate (IRON) 325 (65 FE) MG TABS Take 325 mg by mouth 2 times daily.  11  Yes Ellen Armstrong MD   isosorbide mononitrate (IMDUR) 30 MG extended release tablet TAKE 1 TABLET BY MOUTH EVERY DAY 9/15/21   Nj Beatty MD   Nitroglycerin (NITRO-TIME PO) Take by mouth    Historical Provider, MD       Current medications:    Current Facility-Administered Medications   Medication Dose Route Frequency Provider Last Rate Last Admin    lactated ringers infusion IntraVENous Continuous Virgilio Kimbrough MD   New Bag at 12/08/21 0912    sodium chloride flush 0.9 % injection 10 mL  10 mL IntraVENous 2 times per day Virgilio Kimbrough MD        sodium chloride flush 0.9 % injection 10 mL  10 mL IntraVENous PRN Virgilio Kimbrough MD        lidocaine PF 1 % injection 1 mL  1 mL IntraDERmal Once PRN Virgilio Kimbrough MD         Facility-Administered Medications Ordered in Other Encounters   Medication Dose Route Frequency Provider Last Rate Last Admin    lidocaine 2 % injection   IntraVENous PRN Roxene [place]rills Ford, APRN - CRNA   80 mg at 12/08/21 1290       Allergies:  No Known Allergies    Problem List:    Patient Active Problem List   Diagnosis Code    Bipolar affective disorder ([facility] [address] F31.9    Asthma J45.909    Abdominal pain of other specified site R10.9    Acute bronchitis J20.9    Acute sinusitis J01.90    Acute URI J06.9    Allergic rhinitis J30.9    Anemia D64.9    Ankylosing spondylitis ([facility] [address] M45.9    Blood in stool K92.1    Body mass index (bmi) 26.0-26.9, adult Z68.26    Dizziness and giddiness R42    Fever due to unspecified condition R50.9    Insomnia G47.00    Pain in soft tissues of limb M79.609    Spinal stenosis, other than cervical M48.00    Swelling of both ankles M25.471, M25.472    Throat pain R07.0    Nausea with vomiting R11.2    Vitamin D deficiency E55.9    Pain of upper abdomen R10.10    Gastroesophageal reflux disease K21.9    Rectal bleeding K62.5    Hematemesis K92.0    Iron deficiency anemia D50.9    Chronic superficial gastritis without bleeding K29.30    Precordial pain R07.2       Past Medical History:        Diagnosis Date    ADHD     Allergic rhinitis     Anemia     Anxiety     Arthritis     Asthma     Bipolar 1 disorder (HCC)     Chicken pox     as a child    COPD (chronic obstructive pulmonary disease) ([facility] [address]     Depression     GERD (gastroesophageal reflux disease)     PTSD (post-traumatic stress disorder) Past Surgical History:        Procedure Laterality Date    APPENDECTOMY       SECTION      x 3    COLONOSCOPY N/A 2020    COLONOSCOPY DIAGNOSTIC [facility] by Connor Dunham MD [facility] N/A 2020    EGD BIOPSY performed by Connor Dunham MD at [facility]. [address] History:    Social History     Tobacco Use    Smoking status: Never Smoker    Smokeless tobacco: Never Used   Substance Use Topics    Alcohol use: Not Currently     Comment: occ                                Counseling given: Not Answered      Vital Signs (Current):   Vitals:    21 1554 21 0906   BP:  127/80   Pulse:  84   Resp:  20   Temp:  97.5 °F (36.4 °C)   TempSrc:  Temporal   SpO2:  100%   Weight: 120 lb (54.4 kg)    Height: 5' (1.524 m)                                               BP Readings from Last 3 Encounters:   21 127/80   21 130/80   21 124/80       NPO Status: Time of last liquid consumption:                         Time of last solid consumption:                         Date of last liquid consumption: 21                        Date of last solid food consumption: 21    BMI:   Wt Readings from Last 3 Encounters:   21 120 lb (54.4 kg)   21 125 lb (56.7 kg)   21 120 lb 12.8 oz (54.8 kg)     Body mass index is 23.44 kg/m².     CBC:   Lab Results   Component Value Date    WBC 5.3 2021    RBC 4.14 2021    HGB 10.7 2021    HCT 33.2 2021    MCV 80.3 2021    RDW 19.9 2021     2021       CMP:   Lab Results   Component Value Date     2021    K 3.7 2021    K 3.9 2021     2021    CO2 21 2021    BUN 10 2021    CREATININE 0.6 2021    GFRAA >60 2021    GFRAA >60 2011    AGRATIO 1.5 2021    LABGLOM >60 2021    GLUCOSE 89 2021    PROT 7.6 08/02/2021    PROT 6.6 02/24/2011    CALCIUM 9.3 08/02/2021    BILITOT 0.4 08/02/2021    ALKPHOS 51 08/02/2021    AST 13 08/02/2021    ALT 10 08/02/2021       POC Tests: No results for input(s): POCGLU, POCNA, POCK, POCCL, POCBUN, POCHEMO, POCHCT in the last 72 hours. Coags: No results found for: PROTIME, INR, APTT    HCG (If Applicable):   Lab Results   Component Value Date    PREGTESTUR Negative 12/08/2021        ABGs: No results found for: PHART, PO2ART, TXH9LKM, NUC8BEE, BEART, C6PHNBSV     Type & Screen (If Applicable):  No results found for: LABABO, LABRH    Drug/Infectious Status (If Applicable):  No results found for: HIV, HEPCAB    COVID-19 Screening (If Applicable):   Lab Results   Component Value Date    COVID19 Not Detected 02/04/2021           Anesthesia Evaluation  Patient summary reviewed and Nursing notes reviewed  Airway: Mallampati: II  TM distance: >3 FB   Neck ROM: full  Mouth opening: > = 3 FB Dental:    (+) edentulous      Pulmonary:normal exam  breath sounds clear to auscultation  (+) COPD:  asthma:                            Cardiovascular:            Rhythm: regular  Rate: normal                    Neuro/Psych:   (+) psychiatric history:            GI/Hepatic/Renal:   (+) GERD:,           Endo/Other:    (+) : arthritis:., .                 Abdominal:             Vascular: negative vascular ROS. Other Findings:             Anesthesia Plan      MAC     ASA 3       Induction: intravenous. Anesthetic plan and risks discussed with patient. Plan discussed with CRNA.                   Jeffery Velazquez MD   12/8/2021

## 2021-12-08 NOTE — OP NOTE
[address]                 [address] [address]                                OPERATIVE REPORT    PATIENT NAME: Zo Blum                     :        1970  MED REC NO:   [id number]                          ROOM:  ACCOUNT NO:   [de-identified]                           ADMIT DATE: 2021  PROVIDER:     Ernesto Alvarez MD    DATE OF PROCEDURE:  2021    PREPROCEDURE DIAGNOSES:  1.  Epigastric pain. 2.  Dyspepsia. POSTPROCEDURE DIAGNOSES:  1.  Mild esophagitis, likely reflux type. 2.  Small hiatal hernia. 3.  Otherwise, normal EGD. PROCEDURE:  EGD with biopsy. SURGEON:  Ernesto Alvarez MD    MEDICATIONS:  MAC per Anesthesia. PROCEDURE INDICATIONS:  This is a 79-year-old female with history of  ADHD, bipolar disorder, asthma, depression, anxiety, diabetes,  arthritis, and osteoporosis, who presents for diagnostic EGD. She  complains of epigastric pain, GERD, dyspepsia, and recent melenic stool. She is also known to have anemia. Thus, diagnostic EGD is being  performed. Her last EGD was in 2020. She also had a colonoscopy in  2020, which showed small hemorrhoids. PROCEDURE DETAILS:  Informed consent obtained after discussing risks,  benefits, alternatives. Full history and physical was performed. The  patient was classified as ASA class III. Medications were given by  Anesthesia. Cardiopulmonary status was continuously monitored  throughout the procedure. The patient was placed in left lateral  decubitus position. Once adequately sedated, a standard upper  gastroscope was inserted in the mouth and advanced under direct  visualization to the second portion of the duodenum.   Entire mucosa of  the esophagus, stomach (retroflexed and forward views), duodenum (bulb,  sweep and second portion) were examined carefully during withdrawal.   The patient tolerated the procedure well without any difficulties. FINDINGS:    ESOPHAGUS:  There was mild LA class B esophagitis in the lower esophagus  at 30 cm from entry. There was a 2 cm hiatal hernia from 30 to 32 cm  from entry. Biopsies were taken of the esophagitis. STOMACH:  The entire stomach appeared normal both in forward and  retroflexed views. DUODENUM:  Examined portion of the duodenum appeared normal.  Biopsies  were taken from second portion to rule out celiac sprue. SUMMARY:  1.  Mild esophagitis, likely reflux type. 2.  Hiatal hernia. 3.  Otherwise, normal EGD. RECOMMENDATIONS:  1. Discharge the patient to home when standard parameters are met. 2.  Await pathology results. 3.  Increase PPI twice daily for 4 weeks. 4.  Check iron levels including B12 and folate. 5.  Consider CT scan of the abdomen and pelvis to rule out other  intra-abdominal pathology as a cause of anemia. 6.  Follow up as needed.     EBL: <5mL    Gautam Roldan MD    D: 12/08/2021 9:46:34       T: 12/08/2021 9:49:32     [email or web address]  Job#: [id number]     Doc#: [id number]    CC:  DANYELLE Tam MD

## 2021-12-08 NOTE — ANESTHESIA POSTPROCEDURE EVALUATION
Department of Anesthesiology  Postprocedure Note    Patient: Leon Enamorado  MRN: [id number]  YOB: 1970  Date of evaluation: 12/8/2021  Time:  11:25 AM     Procedure Summary     Date: 12/08/21 Room / Location: [place] [address] / [facility]    Anesthesia Start: 1761 Anesthesia Stop: 7131    Procedure: EGD BIOPSY (N/A ) Diagnosis: (EPIGASTRIC/ABDOMINAL PAIN, ABDOMINAL DISTENTION, GERD, NAUSEA, CHANGE IN BOWEL HABITS)    Surgeons: Marquez Dejesus MD Responsible Provider: Bob Guerra MD    Anesthesia Type: MAC ASA Status: 3          Anesthesia Type: MAC    Seema Phase I: Seema Score: 10    Seema Phase II: Seema Score: 10    Last vitals: Reviewed and per EMR flowsheets.        Anesthesia Post Evaluation    Patient location during evaluation: PACU  Patient participation: complete - patient participated  Level of consciousness: awake and alert  Pain score: 0  Airway patency: patent  Nausea & Vomiting: no nausea and no vomiting  Complications: no  Cardiovascular status: blood pressure returned to baseline  Respiratory status: acceptable  Hydration status: stable

## 2021-12-08 NOTE — BRIEF OP NOTE
Brief Postoperative Note      Patient: Belinda Sylvester  YOB: 1970  MRN: [id number]    Date of Procedure: 12/8/2021    Pre-Op Diagnosis: EPIGASTRIC/ABDOMINAL PAIN, ABDOMINAL DISTENTION, GERD, NAUSEA, CHANGE IN BOWEL HABITS    Post-Op Diagnosis: esophagitis, hiatal hernia, otherwise normal EGD       Procedure(s):  EGD W/ANES.  (9:30)    Surgeon(s):  Sandi Romero MD    Assistant:  * No surgical staff found *    Anesthesia: Monitor Anesthesia Care    Estimated Blood Loss (mL): Minimal    Complications: None    Specimens:   ID Type Source Tests Collected by Time Destination   A :  Tissue Biopsy SURGICAL PATHOLOGY Sandi Romero MD 12/8/2021 0930    B :  Tissue Biopsy SURGICAL PATHOLOGY Sandi Romero MD 12/8/2021 1118        Implants:  * No implants in log *      Drains: * No LDAs found *    Findings: esophagitis, hiatal hernia, otherwise normal EGD    Electronically signed by Sandi Romero MD on 12/8/2021 at 9:33 AM

## 2022-01-12 ENCOUNTER — HOSPITAL ENCOUNTER (OUTPATIENT)
Dept: CT IMAGING | Age: 52
Discharge: HOME | End: 2022-01-12
Payer: MEDICARE

## 2022-01-12 DIAGNOSIS — D64.9 ANEMIA, UNSPECIFIED TYPE: ICD-10-CM

## 2022-01-12 PROCEDURE — 6360000004 HC RX CONTRAST MEDICATION: Performed by: INTERNAL MEDICINE

## 2022-01-12 PROCEDURE — 74177 CT ABD & PELVIS W/CONTRAST: CPT

## 2022-01-12 RX ADMIN — IOHEXOL 50 ML: 240 INJECTION, SOLUTION INTRATHECAL; INTRAVASCULAR; INTRAVENOUS; ORAL at 11:01

## 2022-01-12 RX ADMIN — IOPAMIDOL 75 ML: 755 INJECTION, SOLUTION INTRAVENOUS at 11:01

## 2022-08-19 ENCOUNTER — HOSPITAL ENCOUNTER (OUTPATIENT)
Age: 52
Discharge: HOME | End: 2022-08-19
Payer: MEDICARE

## 2022-08-19 LAB
A/G RATIO: 1.4 (ref 1.1–2.2)
ALBUMIN SERPL-MCNC: 4 G/DL (ref 3.4–5)
ALP BLD-CCNC: 50 U/L (ref 40–129)
ALT SERPL-CCNC: 9 U/L (ref 10–40)
ANION GAP SERPL CALCULATED.3IONS-SCNC: 9 MMOL/L (ref 3–16)
AST SERPL-CCNC: 12 U/L (ref 15–37)
BILIRUB SERPL-MCNC: 0.4 MG/DL (ref 0–1)
BUN BLDV-MCNC: 10 MG/DL (ref 7–20)
CALCIUM SERPL-MCNC: 8.7 MG/DL (ref 8.3–10.6)
CHLORIDE BLD-SCNC: 107 MMOL/L (ref 99–110)
CO2: 24 MMOL/L (ref 21–32)
CREAT SERPL-MCNC: 0.6 MG/DL (ref 0.6–1.1)
GFR AFRICAN AMERICAN: >60
GFR NON-AFRICAN AMERICAN: >60
GLUCOSE BLD-MCNC: 105 MG/DL (ref 70–99)
INR BLD: 0.98 (ref 0.87–1.14)
LIPASE: 71 U/L (ref 13–60)
POTASSIUM SERPL-SCNC: 3.7 MMOL/L (ref 3.5–5.1)
PROTHROMBIN TIME: 12.9 SEC (ref 11.7–14.5)
SODIUM BLD-SCNC: 140 MMOL/L (ref 136–145)
TOTAL PROTEIN: 6.8 G/DL (ref 6.4–8.2)

## 2022-08-19 PROCEDURE — 83516 IMMUNOASSAY NONANTIBODY: CPT

## 2022-08-19 PROCEDURE — 85025 COMPLETE CBC W/AUTO DIFF WBC: CPT

## 2022-08-19 PROCEDURE — 82784 ASSAY IGA/IGD/IGG/IGM EACH: CPT

## 2022-08-19 PROCEDURE — 83013 H PYLORI (C-13) BREATH: CPT

## 2022-08-19 PROCEDURE — 83690 ASSAY OF LIPASE: CPT

## 2022-08-19 PROCEDURE — 85610 PROTHROMBIN TIME: CPT

## 2022-08-19 PROCEDURE — 80053 COMPREHEN METABOLIC PANEL: CPT

## 2022-08-20 LAB
BASOPHILS ABSOLUTE: 0 K/UL (ref 0–0.2)
BASOPHILS RELATIVE PERCENT: 0.5 %
EOSINOPHILS ABSOLUTE: 0.1 K/UL (ref 0–0.6)
EOSINOPHILS RELATIVE PERCENT: 2.5 %
HCT VFR BLD CALC: 33.6 % (ref 36–48)
HEMOGLOBIN: 11 G/DL (ref 12–16)
IGA: 161 MG/DL (ref 70–400)
LYMPHOCYTES ABSOLUTE: 1.3 K/UL (ref 1–5.1)
LYMPHOCYTES RELATIVE PERCENT: 24.2 %
MCH RBC QN AUTO: 27.4 PG (ref 26–34)
MCHC RBC AUTO-ENTMCNC: 32.7 G/DL (ref 31–36)
MCV RBC AUTO: 83.9 FL (ref 80–100)
MONOCYTES ABSOLUTE: 0.4 K/UL (ref 0–1.3)
MONOCYTES RELATIVE PERCENT: 6.8 %
NEUTROPHILS ABSOLUTE: 3.5 K/UL (ref 1.7–7.7)
NEUTROPHILS RELATIVE PERCENT: 66 %
PDW BLD-RTO: 16.8 % (ref 12.4–15.4)
PLATELET # BLD: 287 K/UL (ref 135–450)
PMV BLD AUTO: 8 FL (ref 5–10.5)
RBC # BLD: 4 M/UL (ref 4–5.2)
TISSUE TRANSGLUTAMINASE IGA: <0.5 U/ML (ref 0–14)
WBC # BLD: 5.3 K/UL (ref 4–11)

## 2022-08-24 LAB — H PYLORI BREATH TEST: NEGATIVE

## 2022-09-06 RX ORDER — ISOSORBIDE MONONITRATE 30 MG/1
TABLET, EXTENDED RELEASE ORAL
Qty: 90 TABLET | Refills: 3 | Status: SHIPPED | OUTPATIENT
Start: 2022-09-06

## 2022-09-06 RX ORDER — METOPROLOL SUCCINATE 25 MG/1
TABLET, EXTENDED RELEASE ORAL
Qty: 90 TABLET | Refills: 3 | Status: SHIPPED | OUTPATIENT
Start: 2022-09-06

## 2022-09-06 NOTE — TELEPHONE ENCOUNTER
Last OV 11/4/21, Next OV 11/4/22  Labs 8/19/22  Assessment:  1. Angina pectoris ([address]    2. Precordial pain          Plan:  1. Medications reviewed  2. Add Toprol XL 25 daily to help decrease heart rate 100/min resting  3. Refilled Nitro SL for chest pain to use as needed, continue imdur  Her most recent lipids 8.16.21 are normal   4. Follow up in a year  5.  Recommend COVID vaccine

## 2022-11-08 NOTE — PROGRESS NOTES
[address] Office Note  2022   Ref By Scot Castro PA-C  Subjective:  Ms. Carrie Villar is being seen for evaluation for chest pain. Normal stress echo but chest pain responding to nitro  Imdur greatly reduced the frequency of CP only two episodes since last 3 months both relieved by SL nitro. She is undergoing testing for esophagus   GB ultasound neg for stones. HPI:   21, she reported that she was stressed. She had EGD scheduled for 2021 for stomach issues. Isosorbide is helping chest pain. She just started a new GI medications. Today 22 she reports that she has not been doing very well. She reports that the medication she was placed on at the last OV has been helping with her precordial pain. she reports break through pain relieved by nitro. She reports that she has stomach ulcers and is seeing Dr. Byron William for treatment. She wants cardiac clearance for repeat endoscopy. She feels pain in epigastric region. No substernal pain or pain with exertion. She says she is anemic and is on iron. She denies dizziness syncope and edema. PMH  Ms Carrie Villar 50 y.o. female with PMH GERD, Gastric ulcer, COPD, depression, anxiety, anemia, bipolar  Seen in ER 21 for chest pain MI ruled out and DC home    Review of Systems:         12 point ROS negative in all areas as listed below except as in [place]  Constitutional, EENT,  pulmonary, GI, , Musculoskeletal, skin, neurological, hematological, endocrine, Psychiatric- anxiety     Reviewed past medical history, social, and family history.    Non smoker around 2nd hand smoke, occasional alcohol  Adopted Mom  MI in her sleep age 78 MGF  MI   Past Medical History:   Diagnosis Date    ADHD     Allergic rhinitis     Anemia     Anxiety     Arthritis     Asthma     Bipolar 1 disorder ([address]     Chicken pox     as a child    COPD (chronic obstructive pulmonary disease) ([facility] [address]     Depression     GERD (gastroesophageal reflux disease)     PTSD (post-traumatic stress disorder)      Past Surgical History:   Procedure Laterality Date    APPENDECTOMY       SECTION      x 3    COLONOSCOPY N/A 2020    COLONOSCOPY DIAGNOSTIC performed by Donn Jaquez MD at [facility] N/A 2020    EGD BIOPSY performed by Donn Jaquez MD at [address] 2021    EGD BIOPSY performed by Evans Rojas MD at [facility]       Objective:   /60   Pulse 96   Ht 5' (1.524 m)   Wt 133 lb (60.3 kg)   SpO2 98%   BMI 25.97 kg/m²     Wt Readings from Last 3 Encounters:   22 133 lb (60.3 kg)   21 120 lb (54.4 kg)   21 125 lb (56.7 kg)       Physical Exam:  General: No Respiratory distress, appears well developed and well nourished. Eyes:  Sclera nonicteric  Nose/Sinuses:  negative findings: nose shows no deformity, asymmetry, or inflammation, nasal mucosa normal, septum midline with no perforation or bleeding  Back:  no pain to palpation  Joint:  no active joint inflammation  Musculoskeletal:  negative  Skin:  Warm and dry  Neck:  Negative for JVD and Carotid Bruits. Chest:  Clear to auscultation, respiration easy  Abdomen no mass epigastric tenderness. No mass no chest wall tenderness. Cardiovascular:  RRR, S1S2 normal, no murmur, no rub or thrill.   Extremities:   No edema, clubbing, cyanosis,  Pulses:  pedal pulses are normal.  Neuro: intact    Medications:   Outpatient Encounter Medications as of 2022   Medication Sig Dispense Refill    isosorbide mononitrate (IMDUR) 30 MG extended release tablet TAKE 1 TABLET BY MOUTH EVERY DAY 90 tablet 3    metoprolol succinate (TOPROL XL) 25 MG extended release tablet TAKE 1 TABLET BY MOUTH EVERY DAY 90 tablet 3    nitroGLYCERIN (NITROSTAT) 0.4 MG SL tablet Place 1 tablet under the tongue every 5 minutes as needed for Chest pain 25 tablet 3 sucralfate (CARAFATE) 1 GM tablet TAKE 1 TABLET BY MOUTH 4 TIMES A DAY AS NEEDED FOR STOMACH      Nitroglycerin (NITRO-TIME PO) Take by mouth      gabapentin (NEURONTIN) 400 MG capsule Take 400 mg by mouth 3 times daily. albuterol sulfate HFA (PROVENTIL HFA) 108 (90 Base) MCG/ACT inhaler Inhale 2 puffs into the lungs every 6 hours as needed for Wheezing (with spacer) 1 Inhaler 0    fluticasone (FLONASE) 50 MCG/ACT nasal spray by Nasal route daily as needed       cyclobenzaprine (FLEXERIL) 10 MG tablet Take 10 mg by mouth 3 times daily as needed for Muscle spasms      calcium carbonate (OSCAL) 500 MG TABS tablet Take 1,000 mg by mouth daily      docusate sodium (COLACE) 100 MG capsule Take 100 mg by mouth 2 times daily as needed       Ferrous Sulfate (IRON) 325 (65 FE) MG TABS Take 325 mg by mouth 2 times daily. 60 tablet 2    [DISCONTINUED] isosorbide mononitrate (IMDUR) 30 MG extended release tablet TAKE 1 TABLET BY MOUTH EVERY DAY 90 tablet 3    [DISCONTINUED] metoprolol succinate (TOPROL XL) 25 MG extended release tablet TAKE 1 TABLET BY MOUTH EVERY DAY 90 tablet 3     No facility-administered encounter medications on file as of 11/9/2022. Lab Data:  CBC: No results for input(s): WBC, HGB, HCT, MCV, PLT in the last 72 hours. BMP: No results for input(s): NA, K, CL, CO2, PHOS, BUN, CREATININE, CA in the last 72 hours. LIVER PROFILE: No results for input(s): AST, ALT, LIPASE, BILIDIR, BILITOT, ALKPHOS in the last 72 hours. Invalid input(s): AMYLASE,  ALB  LIPID: No results found for: CHOL  No results found for: TRIG  Lab Results   Component Value Date    HDL 64 (H) 08/16/2021     Lab Results   Component Value Date    LDLCALC 62 08/16/2021     Lab Results   Component Value Date    LABVLDL 15 08/16/2021     No results found for: CHOLHDLRATIO  PT/INR: No results for input(s): PROTIME, INR in the last 72 hours.   A1C: No results found for: LABA1C  BNP:  No results for input(s): BNP in the last 72 hours.    IMAGING:   I have reviewed the following tests and documented in this encounter as follows:   Discussed with patient    EKG 11.9.22 NSR low amplitude QRS  Echo stress test 5/28/21  Summary  baseline resting EKG has normal sinus rhythm. EKG is within normal limits. Patient exercised on treadmill according to standard [place] protocol for 8:43  minutes. Peak heart rate achieved is 146/min, 85% of predicted maximum. No cardiac arrhythmia. No symptoms reported. No EKG changes of stress induced left ventricular ischemia. Images obtained in 63 seconds. Baseline resting echocardiogram shows normal global LV systolic function  with an ejection fraction of 60% and uniform myocardial segmental wall  motion. Following stress there was uniform augmentation of all myocardial  segments with appropriate hyperdynamic LV systolic response to stress. Normal stress echocardiogram.    EKG 2/4/21  Normal sinus rhythmNormal ECGNo previous ECGs availableConfirmed by Kennedy Finley MD, [address] ([id number]) on 2/4/2021 8:59:09 PM    CXR 2/4/21  FINDINGS: The lungs are without acute focal process. There is no effusion or pneumothorax. The cardiomediastinal silhouette is stable. The osseous structures are stable. EKG 9/1/19  Normal sinus rhythmNormal ECGNo previous ECGs availableConfirmed by Counts include 234 beds at the [facility] MD, [address] ([id number]) on 9/2/2019 10:11:08 AM    Assessment:  1. Angina pectoris, variant ([facility] [address]    2. Medication management    3. Gastroesophageal reflux disease with esophagitis without hemorrhage    Chest pain is atypical located in epigastric region reproduced by direct pressure and not worsened by exertion. Nevertheless it improved with nitrates   neg stress test for ischemia in 2021   Continue Sucralfate consider PPI  Patient is to see gastroenterologist.    Plan:  Cardiac clearance given for EGD scope with Dr. Alma Laws. Low to intermediate cardiovascular risk. Refills given today for Imdur and metoprolol succinate.  Ordered Nitro SL  CBC CMP LIPIDS-fasting TSH  last lipids normal in August 2021  Follow up with me in 6 months        QUALITY MEASURES  1. Tobacco Cessation Counseling: NA  2. Retake of BP if >140/90:   NA  3. Documentation to PCP/referring for new patient:  Sent to PCP at close of office visit  4. CAD patient on anti-platelet: NA  5. CAD patient on STATIN therapy:  NA  6. Patient with CHF and aFib on anticoagulation:  NA     Scribe's attestation: This note was scribed in the presence of Dr. Princess Marcial MD   by Drew Kent LPN      I, Dr. Princess Marcial, personally performed the services described in this documentation, as scribed by the above signed scribe in my presence. It is both accurate and complete to my knowledge. I agree with the details independently gathered by the clinical support staff, while the remaining scribed note accurately describes my personal service to the patient. Dr. Princess Marcial.

## 2022-11-09 ENCOUNTER — OFFICE VISIT (OUTPATIENT)
Dept: CARDIOLOGY CLINIC | Age: 52
End: 2022-11-09
Payer: MEDICARE

## 2022-11-09 VITALS
DIASTOLIC BLOOD PRESSURE: 60 MMHG | HEART RATE: 96 BPM | BODY MASS INDEX: 26.11 KG/M2 | SYSTOLIC BLOOD PRESSURE: 110 MMHG | HEIGHT: 60 IN | OXYGEN SATURATION: 98 % | WEIGHT: 133 LBS

## 2022-11-09 DIAGNOSIS — Z79.899 MEDICATION MANAGEMENT: ICD-10-CM

## 2022-11-09 DIAGNOSIS — I20.1 ANGINA PECTORIS, VARIANT (HCC): Primary | ICD-10-CM

## 2022-11-09 DIAGNOSIS — K21.00 GASTROESOPHAGEAL REFLUX DISEASE WITH ESOPHAGITIS WITHOUT HEMORRHAGE: ICD-10-CM

## 2022-11-09 PROCEDURE — G8419 CALC BMI OUT NRM PARAM NOF/U: HCPCS | Performed by: INTERNAL MEDICINE

## 2022-11-09 PROCEDURE — 3017F COLORECTAL CA SCREEN DOC REV: CPT | Performed by: INTERNAL MEDICINE

## 2022-11-09 PROCEDURE — 99214 OFFICE O/P EST MOD 30 MIN: CPT | Performed by: INTERNAL MEDICINE

## 2022-11-09 PROCEDURE — 93000 ELECTROCARDIOGRAM COMPLETE: CPT | Performed by: INTERNAL MEDICINE

## 2022-11-09 PROCEDURE — 1036F TOBACCO NON-USER: CPT | Performed by: INTERNAL MEDICINE

## 2022-11-09 PROCEDURE — G8484 FLU IMMUNIZE NO ADMIN: HCPCS | Performed by: INTERNAL MEDICINE

## 2022-11-09 PROCEDURE — G8427 DOCREV CUR MEDS BY ELIG CLIN: HCPCS | Performed by: INTERNAL MEDICINE

## 2022-11-09 RX ORDER — NITROGLYCERIN 0.4 MG/1
0.4 TABLET SUBLINGUAL EVERY 5 MIN PRN
Qty: 25 TABLET | Refills: 3 | Status: SHIPPED | OUTPATIENT
Start: 2022-11-09

## 2022-11-09 RX ORDER — METOPROLOL SUCCINATE 25 MG/1
TABLET, EXTENDED RELEASE ORAL
Qty: 90 TABLET | Refills: 3 | Status: SHIPPED | OUTPATIENT
Start: 2022-11-09

## 2022-11-09 RX ORDER — ISOSORBIDE MONONITRATE 30 MG/1
TABLET, EXTENDED RELEASE ORAL
Qty: 90 TABLET | Refills: 3 | Status: SHIPPED | OUTPATIENT
Start: 2022-11-09

## 2022-11-09 NOTE — LETTER
The [address] [place] [address], [address] [place] [address]  J:[phone]  F: [phone]      November 9, 2022    Jayme Sun  1970  Seen today in office with me for cardiology follow up    Dear Dr. Max Heck (1970) is at low to intermediate cardiovascular risk and may proceed with EGD procedure. If you need anything else or have any questions, please do not hesitate to call my office.     Thank you,   Dr. Ximena Hernandez MD

## 2022-11-09 NOTE — PATIENT INSTRUCTIONS
Plan:  Cardiac clearance given for EGD scope with Dr. Maryan Beard. Low to intermediate cardiovascular risk. Refills given today for Imdur and metoprolol succinate.  Ordered Nitro SL  CBC CMP LIPIDS-fasting TSH   Follow up with me in 6 months

## 2022-11-10 ENCOUNTER — HOSPITAL ENCOUNTER (OUTPATIENT)
Age: 52
Discharge: HOME | End: 2022-11-10
Payer: MEDICARE

## 2022-11-10 DIAGNOSIS — Z79.899 MEDICATION MANAGEMENT: ICD-10-CM

## 2022-11-10 LAB
BASOPHILS ABSOLUTE: 0 K/UL (ref 0–0.2)
BASOPHILS RELATIVE PERCENT: 0.7 %
EOSINOPHILS ABSOLUTE: 0.2 K/UL (ref 0–0.6)
EOSINOPHILS RELATIVE PERCENT: 3.2 %
HCT VFR BLD CALC: 33.7 % (ref 36–48)
HEMOGLOBIN: 10.9 G/DL (ref 12–16)
IRON SATURATION: 8 % (ref 15–50)
IRON: 36 UG/DL (ref 37–145)
LYMPHOCYTES ABSOLUTE: 1.5 K/UL (ref 1–5.1)
LYMPHOCYTES RELATIVE PERCENT: 28.4 %
MCH RBC QN AUTO: 26.2 PG (ref 26–34)
MCHC RBC AUTO-ENTMCNC: 32.2 G/DL (ref 31–36)
MCV RBC AUTO: 81.3 FL (ref 80–100)
MONOCYTES ABSOLUTE: 0.4 K/UL (ref 0–1.3)
MONOCYTES RELATIVE PERCENT: 7.3 %
NEUTROPHILS ABSOLUTE: 3.2 K/UL (ref 1.7–7.7)
NEUTROPHILS RELATIVE PERCENT: 60.4 %
PDW BLD-RTO: 19.3 % (ref 12.4–15.4)
PLATELET # BLD: 319 K/UL (ref 135–450)
PMV BLD AUTO: 7.8 FL (ref 5–10.5)
RBC # BLD: 4.15 M/UL (ref 4–5.2)
TOTAL IRON BINDING CAPACITY: 429 UG/DL (ref 260–445)
WBC # BLD: 5.3 K/UL (ref 4–11)

## 2022-11-10 PROCEDURE — 86665 EPSTEIN-BARR CAPSID VCA: CPT

## 2022-11-10 PROCEDURE — 83550 IRON BINDING TEST: CPT

## 2022-11-10 PROCEDURE — 36415 COLL VENOUS BLD VENIPUNCTURE: CPT

## 2022-11-10 PROCEDURE — 84443 ASSAY THYROID STIM HORMONE: CPT

## 2022-11-10 PROCEDURE — 86663 EPSTEIN-BARR ANTIBODY: CPT

## 2022-11-10 PROCEDURE — 86664 EPSTEIN-BARR NUCLEAR ANTIGEN: CPT

## 2022-11-10 PROCEDURE — 82306 VITAMIN D 25 HYDROXY: CPT

## 2022-11-10 PROCEDURE — 82728 ASSAY OF FERRITIN: CPT

## 2022-11-10 PROCEDURE — 83540 ASSAY OF IRON: CPT

## 2022-11-10 PROCEDURE — 80061 LIPID PANEL: CPT

## 2022-11-10 PROCEDURE — 86617 LYME DISEASE ANTIBODY: CPT

## 2022-11-10 PROCEDURE — 80053 COMPREHEN METABOLIC PANEL: CPT

## 2022-11-10 PROCEDURE — 85025 COMPLETE CBC W/AUTO DIFF WBC: CPT

## 2022-11-11 LAB
A/G RATIO: 2 (ref 1.1–2.2)
ALBUMIN SERPL-MCNC: 4.5 G/DL (ref 3.4–5)
ALP BLD-CCNC: 55 U/L (ref 40–129)
ALT SERPL-CCNC: 10 U/L (ref 10–40)
ANION GAP SERPL CALCULATED.3IONS-SCNC: 13 MMOL/L (ref 3–16)
AST SERPL-CCNC: 12 U/L (ref 15–37)
BILIRUB SERPL-MCNC: 0.4 MG/DL (ref 0–1)
BUN BLDV-MCNC: 15 MG/DL (ref 7–20)
CALCIUM SERPL-MCNC: 9.6 MG/DL (ref 8.3–10.6)
CHLORIDE BLD-SCNC: 108 MMOL/L (ref 99–110)
CHOLESTEROL, FASTING: 151 MG/DL (ref 0–199)
CO2: 21 MMOL/L (ref 21–32)
CREAT SERPL-MCNC: 0.6 MG/DL (ref 0.6–1.1)
FERRITIN: 5.3 NG/ML (ref 15–150)
GFR SERPL CREATININE-BSD FRML MDRD: >60 ML/MIN/{1.73_M2}
GLUCOSE BLD-MCNC: 91 MG/DL (ref 70–99)
HDLC SERPL-MCNC: 53 MG/DL (ref 40–60)
LDL CHOLESTEROL CALCULATED: 75 MG/DL
POTASSIUM SERPL-SCNC: 4.5 MMOL/L (ref 3.5–5.1)
SODIUM BLD-SCNC: 142 MMOL/L (ref 136–145)
TOTAL PROTEIN: 6.8 G/DL (ref 6.4–8.2)
TRIGLYCERIDE, FASTING: 115 MG/DL (ref 0–150)
TSH REFLEX FT4: 1.42 UIU/ML (ref 0.27–4.2)
VITAMIN D 25-HYDROXY: 26.2 NG/ML
VLDLC SERPL CALC-MCNC: 23 MG/DL

## 2022-11-12 LAB
EPSTEIN BARR VIRUS NUCLEAR AB IGG: <3 U/ML (ref 0–21.9)
EPSTEIN-BARR EARLY ANTIGEN ANTIBODY: 71.6 U/ML (ref 0–10.9)
EPSTEIN-BARR VCA IGG: >750 U/ML (ref 0–21.9)
EPSTEIN-BARR VCA IGM: >160 U/ML (ref 0–43.9)
LYME (B. BURGDORFERI) AB IGG WB: NEGATIVE
LYME AB IGM BY WB:: NEGATIVE

## 2022-11-14 ENCOUNTER — TELEPHONE (OUTPATIENT)
Dept: CARDIOLOGY CLINIC | Age: 52
End: 2022-11-14

## 2022-11-14 NOTE — LETTER
Kick Counts    It’s normal to worry about your baby’s health. One way you can know your baby’s doing well is to record the baby’s movements once a day. This is called a kick count. Remember to take your kick count records to all your appointments with your healthcare provider.  How to count kicks  Here are tips for counting kicks:  Choose a time when the baby is active, such as after a meal.   Sit comfortably or lie on your side.   The first time the baby moves, write down the time.   Count each movement until the baby has moved 10 times. This can take from 20 minutes to 2 hours.   Try to do it at the same time each day.  When to call your healthcare provider  Call your healthcare provider right away if you notice any of the following:  Your baby moves fewer than 10 times in 2 hours while you’re doing kick counts.  Your baby moves much less often than on the days before.  You have not felt your baby move all day.  Date Last Reviewed: 12/1/2017 © 2000-2018 [employer]. [address] [place] [address]. All rights reserved. This information is not intended as a substitute for professional medical care. Always follow your healthcare professional's instructions.        Abdominal Pain in Pregnancy, Gallstones  You have pain in your upper belly (abdomen) that may be caused by gallstones. The gallbladder is an organ that stores fluid called bile. The gallbladder sends bile into the intestines to help you digest your food. A small amount of bile sometimes stays in the gallbladder. In time, this bile can harden, forming gallstones. If stones move into the tube (duct) that carries bile out of the gallbladder, they can cause pain or infection.     Gallstones are more likely to occur during pregnancy. This is because you have higher amounts of hormones at this time that affect the biliary system. Other things that make it more likely to have gallstones are family history and a diet that’s high in fatty  [address]  [id number]  [address] [address]  Phone: [phone]  Fax: [phone]    Claudio Krueger MD        November 16, 2022    [place] [id number]      Dear Karissa Rivas:    Our office has attempted to reach you. Please call [phone] when you receive this letter.     Sincerely,    Claudio Krueger MD foods.   One common symptom is pain and cramping in your belly, often after you eat. The pain is in the upper right part of your belly. These are other common symptoms of gallstones:   Nausea and vomiting  Loss of appetite  Itching without a rash  Jaundice  Pale-color stool  Dark urine  Fever  You and your healthcare provider will decide on the best treatment for you. Here’s what you can do to ease your discomfort in the meantime.   Home care  Medicines  Your healthcare provider may prescribe medicine to help ease pain. Follow your healthcare provider’s instructions for taking these medicines.   General care  You can’t control your family history or your hormones. But you can make changes to your diet. Changing your diet won't fix your gallbladder, but it may help with the symptoms. Here are some general care guidelines:   Eat a diet high in fiber and low in fat. Don't eat greasy or fried foods.  Read food labels to be sure the foods you are choosing are low in fat.  Limit high-fat meats, dairy products, animal fats, and vegetable oils.  Keep all appointments with your healthcare provider. Your provider needs to watch your condition.  Surgery for gallstones is generally not done during pregnancy unless the gallstones are causing severe pain or you have an infection. Discuss your treatment choices with your provider. You might need:   Medicine to dissolve the stones.  A procedure called an ERCP, to find the stones and remove them. The ERCP uses a thin tube with video and X-rays.  Surgery to remove the gallstones.  Even after treatment, gallstones can return.  Follow-up care  Follow up with your healthcare provider, or as advised.  Call 911  Call 911 if any of these occur:   Severe lightheadedness, passing out, or fainting  Fast heart rate  Trouble breathing  Confusion or trouble waking up  When to get medical advice   Call your healthcare provider right away if any of these occur:  Fever of 100.4°F (38°C) or higher,  or as directed by your provider  Severe pain in the upper belly, shoulder, or back  Nausea or vomiting  Yellowing of the skin or eyes (jaundice)  Vaginal bleeding, fluid leaking from the vagina, or baby's lack of movement  Faina last reviewed this educational content on 2/1/2020 © 2000-2020 [employer]. All rights reserved. This information is not intended as a substitute for professional medical care. Always follow your healthcare professional's instructions.        Established Pregnancy, Normal Symptoms    You are pregnant and are having symptoms that worry you. During pregnancy, it’s normal to have many kinds of symptoms. Here is a list of common symptoms that happen during pregnancy.  Head and mouth  · Bleeding gums  · Dizziness and fainting  · Extra saliva  · Headaches  · Nosebleeds  · Skin color changes on your face  · Stuffy nose  · Mild blurriness of vision, especially if you wear contact lenses  Breasts  · Darkening of nipples  · Yellow or white discharge from the nipples  · Sore breasts and nipples  · Swollen breasts  Back, arms, and legs  · Back, hip, or thigh pain  · Leg cramps that come and go  · Numbness and tingling in your hands and fingers  · Swollen hands, legs, and feet  · Swollen leg veins  Belly (abdomen) and pelvis  · Constipation  · Feeling of pressure on your bladder and stomach  · Need to urinate often  · Gas and bloating  · Heartburn  · Anal itching, swelling, and bleeding (hemorrhoids)  · Leaking urine  · Mild pressure or cramping in your belly  · Nausea and vomiting throughout the day or night (morning sickness)  · Swollen belly  · Clear to white vaginal discharge  Other symptoms  · Dry, itchy skin  · Forgetfulness  · Less interest in sex  · Mood swings  · Tiredness  · Trouble sleeping  Home care  Here is information that may help relieve some common pregnancy symptoms.  Sore and swollen breasts  · Wear a support bra that fits properly.  Nausea and indigestion  · Eat  smaller meals or snacks more often.  · Eat bland foods, such as bananas, crackers, or rice.  · Stay away from spicy, fatty, or fried foods.  · Stay away from alcohol, caffeine, and tobacco.  · Don’t lie down right after eating.  · Raise your head with pillows when you lie down.  · Eat foods or beverages that have ginger. If you drink ginger ale, be sure to make sure it has real ginger and not just ginger flavoring.  Leg swelling and varicose veins  · Wear elastic support hose. Put your feet up as often as possible.  Constipation  · Eat more fresh fruits and vegetables and more whole grains. Drink more clear liquids.  Joint and muscle pains  · Avoid heavy lifting.  · Pick things up by bending at your knees, not at your waist.  · Use acetaminophen for joint and muscle pain. Don't use aspirin, ibuprofen, or naproxen.  Mouth and nose dryness or bleeding  · Drink more liquids. Use a vaporizer or humidifier in your bedroom.  Don’t take medicines or use remedies that your healthcare provider hasn’t approved. If you have symptoms that are severe or sudden, call your healthcare provider.  Call 911  Call 911 if any of these occur:  · New chest, arm, shoulder, neck, or upper back pain  · Trouble breathing  · Severe belly pain or very heavy bleeding  · Severe lightheadedness, passing out, or fainting  · Rapid heart rate  · Confusion or difficulty waking up  When to seek medical advice  Call your healthcare provider right away if any of these occur:  · Burning or pain when you urinate  · Depression or severe anxiety  · Desire to eat or drink nonfood items such as paper, dirt, or cleaning products  · Diarrhea that lasts more than 24 hours  · Fast heartbeat or heart palpitations  · Fever of 100.4°F (38°C) or higher, or as directed by your healthcare provider  · You can’t keep fluids down for 6 hours without vomiting  · Severe or ongoing vomiting  · Little or no urine  · Major vision changes  · Moderate or severe belly  pain  · Severe back pain  · Severe constipation  · Severe cramping or swelling in a leg, especially if it’s just on one side  · Severe headache  · Sudden swelling of your face, hands, feet, or ankles  · Vaginal bleeding  · Very itchy skin that doesn’t get better  Date Last Reviewed: 10/1/2016  © 1994-3341 [employer]. [address] [place] [address]. All rights reserved. This information is not intended as a substitute for professional medical care. Always follow your healthcare professional's instructions.

## 2022-11-14 NOTE — TELEPHONE ENCOUNTER
Attempted to reach pt at home number but not able to leave a VM. Call cell phone number and got her mother who told me this is the mother's home number.   No one is listed on [employer] form so I asked her to relay a message to call out office

## 2022-12-14 ENCOUNTER — HOSPITAL ENCOUNTER (OUTPATIENT)
Dept: GENERAL RADIOLOGY | Age: 52
Discharge: HOME | End: 2022-12-14
Payer: MEDICARE

## 2022-12-14 DIAGNOSIS — R14.0 ABDOMINAL DISTENSION: ICD-10-CM

## 2022-12-14 PROCEDURE — 74250 X-RAY XM SM INT 1CNTRST STD: CPT

## 2022-12-20 ENCOUNTER — HOSPITAL ENCOUNTER (OUTPATIENT)
Age: 52
Discharge: HOME | End: 2022-12-20
Payer: MEDICARE

## 2022-12-20 LAB
A/G RATIO: 1.4 (ref 1.1–2.2)
ALBUMIN SERPL-MCNC: 4.3 G/DL (ref 3.4–5)
ALP BLD-CCNC: 56 U/L (ref 40–129)
ALT SERPL-CCNC: 10 U/L (ref 10–40)
ANION GAP SERPL CALCULATED.3IONS-SCNC: 11 MMOL/L (ref 3–16)
AST SERPL-CCNC: 16 U/L (ref 15–37)
BASOPHILS ABSOLUTE: 0.1 K/UL (ref 0–0.2)
BASOPHILS RELATIVE PERCENT: 1 %
BILIRUB SERPL-MCNC: 0.3 MG/DL (ref 0–1)
BUN BLDV-MCNC: 11 MG/DL (ref 7–20)
CALCIUM SERPL-MCNC: 10 MG/DL (ref 8.3–10.6)
CHLORIDE BLD-SCNC: 105 MMOL/L (ref 99–110)
CO2: 24 MMOL/L (ref 21–32)
CREAT SERPL-MCNC: 0.6 MG/DL (ref 0.6–1.1)
EOSINOPHILS ABSOLUTE: 0.1 K/UL (ref 0–0.6)
EOSINOPHILS RELATIVE PERCENT: 2.2 %
GFR SERPL CREATININE-BSD FRML MDRD: >60 ML/MIN/{1.73_M2}
GLUCOSE BLD-MCNC: 89 MG/DL (ref 70–99)
HCT VFR BLD CALC: 33.4 % (ref 36–48)
HEMOGLOBIN: 11.1 G/DL (ref 12–16)
INR BLD: 0.99 (ref 0.87–1.14)
LIPASE: 60 U/L (ref 13–60)
LYMPHOCYTES ABSOLUTE: 1.8 K/UL (ref 1–5.1)
LYMPHOCYTES RELATIVE PERCENT: 28.5 %
MCH RBC QN AUTO: 26.9 PG (ref 26–34)
MCHC RBC AUTO-ENTMCNC: 33.1 G/DL (ref 31–36)
MCV RBC AUTO: 81.2 FL (ref 80–100)
MONOCYTES ABSOLUTE: 0.6 K/UL (ref 0–1.3)
MONOCYTES RELATIVE PERCENT: 8.8 %
NEUTROPHILS ABSOLUTE: 3.8 K/UL (ref 1.7–7.7)
NEUTROPHILS RELATIVE PERCENT: 59.5 %
PDW BLD-RTO: 18.5 % (ref 12.4–15.4)
PLATELET # BLD: 350 K/UL (ref 135–450)
PMV BLD AUTO: 7.4 FL (ref 5–10.5)
POTASSIUM SERPL-SCNC: 4.3 MMOL/L (ref 3.5–5.1)
PROTHROMBIN TIME: 13 SEC (ref 11.7–14.5)
RBC # BLD: 4.11 M/UL (ref 4–5.2)
SODIUM BLD-SCNC: 140 MMOL/L (ref 136–145)
TOTAL PROTEIN: 7.3 G/DL (ref 6.4–8.2)
WBC # BLD: 6.4 K/UL (ref 4–11)

## 2022-12-20 PROCEDURE — 80053 COMPREHEN METABOLIC PANEL: CPT

## 2022-12-20 PROCEDURE — 85610 PROTHROMBIN TIME: CPT

## 2022-12-20 PROCEDURE — 85025 COMPLETE CBC W/AUTO DIFF WBC: CPT

## 2022-12-20 PROCEDURE — 83690 ASSAY OF LIPASE: CPT

## 2023-03-16 ENCOUNTER — TRANSCRIBE ORDERS (OUTPATIENT)
Dept: ADMINISTRATIVE | Age: 53
End: 2023-03-16

## 2023-03-16 DIAGNOSIS — R10.9 ABDOMINAL PAIN, UNSPECIFIED ABDOMINAL LOCATION: ICD-10-CM

## 2023-03-16 DIAGNOSIS — K29.91: Primary | ICD-10-CM

## 2023-03-16 DIAGNOSIS — K29.71: Primary | ICD-10-CM

## 2023-03-28 ENCOUNTER — TRANSCRIBE ORDERS (OUTPATIENT)
Dept: ADMINISTRATIVE | Age: 53
End: 2023-03-28

## 2023-03-28 DIAGNOSIS — K29.71 GASTRITIS WITH HEMORRHAGE, UNSPECIFIED CHRONICITY, UNSPECIFIED GASTRITIS TYPE: Primary | ICD-10-CM

## 2023-03-28 DIAGNOSIS — R10.13 ABDOMINAL PAIN, EPIGASTRIC: ICD-10-CM

## 2023-03-28 DIAGNOSIS — D53.9 MACROCYTIC ANEMIA: ICD-10-CM

## 2023-05-10 ENCOUNTER — HOSPITAL ENCOUNTER (OUTPATIENT)
Age: 53
Discharge: HOME | End: 2023-05-10
Payer: MEDICARE

## 2023-05-10 LAB
ALBUMIN SERPL-MCNC: 4.5 G/DL (ref 3.4–5)
ALBUMIN/GLOB SERPL: 1.5 {RATIO} (ref 1.1–2.2)
ALP SERPL-CCNC: 47 U/L (ref 40–129)
ALT SERPL-CCNC: 9 U/L (ref 10–40)
ANION GAP SERPL CALCULATED.3IONS-SCNC: 10 MMOL/L (ref 3–16)
AST SERPL-CCNC: 12 U/L (ref 15–37)
BASOPHILS # BLD: 0.1 K/UL (ref 0–0.2)
BASOPHILS NFR BLD: 1 %
BILIRUB SERPL-MCNC: 0.3 MG/DL (ref 0–1)
BUN SERPL-MCNC: 10 MG/DL (ref 7–20)
CALCIUM SERPL-MCNC: 10 MG/DL (ref 8.3–10.6)
CHLORIDE SERPL-SCNC: 106 MMOL/L (ref 99–110)
CO2 SERPL-SCNC: 24 MMOL/L (ref 21–32)
CREAT SERPL-MCNC: 0.7 MG/DL (ref 0.6–1.1)
DEPRECATED RDW RBC AUTO: 19.2 % (ref 12.4–15.4)
EOSINOPHIL # BLD: 0.2 K/UL (ref 0–0.6)
EOSINOPHIL NFR BLD: 2.5 %
GFR SERPLBLD CREATININE-BSD FMLA CKD-EPI: >60 ML/MIN/{1.73_M2}
GLUCOSE SERPL-MCNC: 109 MG/DL (ref 70–99)
HCT VFR BLD AUTO: 33.1 % (ref 36–48)
HGB BLD-MCNC: 10.9 G/DL (ref 12–16)
LIPASE SERPL-CCNC: 79 U/L (ref 13–60)
LYMPHOCYTES # BLD: 1.7 K/UL (ref 1–5.1)
LYMPHOCYTES NFR BLD: 23.7 %
MCH RBC QN AUTO: 25.4 PG (ref 26–34)
MCHC RBC AUTO-ENTMCNC: 32.8 G/DL (ref 31–36)
MCV RBC AUTO: 77.5 FL (ref 80–100)
MONOCYTES # BLD: 0.7 K/UL (ref 0–1.3)
MONOCYTES NFR BLD: 9.3 %
NEUTROPHILS # BLD: 4.7 K/UL (ref 1.7–7.7)
NEUTROPHILS NFR BLD: 63.5 %
PLATELET # BLD AUTO: 318 K/UL (ref 135–450)
PMV BLD AUTO: 8.3 FL (ref 5–10.5)
POTASSIUM SERPL-SCNC: 4 MMOL/L (ref 3.5–5.1)
PROT SERPL-MCNC: 7.6 G/DL (ref 6.4–8.2)
RBC # BLD AUTO: 4.27 M/UL (ref 4–5.2)
SODIUM SERPL-SCNC: 140 MMOL/L (ref 136–145)
WBC # BLD AUTO: 7.3 K/UL (ref 4–11)

## 2023-05-10 PROCEDURE — 83550 IRON BINDING TEST: CPT

## 2023-05-10 PROCEDURE — 82607 VITAMIN B-12: CPT

## 2023-05-10 PROCEDURE — 83540 ASSAY OF IRON: CPT

## 2023-05-10 PROCEDURE — 85025 COMPLETE CBC W/AUTO DIFF WBC: CPT

## 2023-05-10 PROCEDURE — 82746 ASSAY OF FOLIC ACID SERUM: CPT

## 2023-05-10 PROCEDURE — 80053 COMPREHEN METABOLIC PANEL: CPT

## 2023-05-10 PROCEDURE — 83690 ASSAY OF LIPASE: CPT

## 2023-05-11 LAB
FOLATE SERPL-MCNC: >20 NG/ML (ref 4.78–24.2)
IRON SATN MFR SERPL: 6 % (ref 15–50)
IRON SERPL-MCNC: 29 UG/DL (ref 37–145)
TIBC SERPL-MCNC: 455 UG/DL (ref 260–445)
VIT B12 SERPL-MCNC: 495 PG/ML (ref 211–911)

## 2023-05-17 ENCOUNTER — OFFICE VISIT (OUTPATIENT)
Dept: CARDIOLOGY CLINIC | Age: 53
End: 2023-05-17
Payer: MEDICARE

## 2023-05-17 VITALS
BODY MASS INDEX: 28.74 KG/M2 | HEART RATE: 92 BPM | DIASTOLIC BLOOD PRESSURE: 62 MMHG | WEIGHT: 146.4 LBS | HEIGHT: 60 IN | OXYGEN SATURATION: 98 % | SYSTOLIC BLOOD PRESSURE: 112 MMHG

## 2023-05-17 DIAGNOSIS — R07.2 PRECORDIAL PAIN: ICD-10-CM

## 2023-05-17 DIAGNOSIS — D50.9 IRON DEFICIENCY ANEMIA, UNSPECIFIED IRON DEFICIENCY ANEMIA TYPE: ICD-10-CM

## 2023-05-17 DIAGNOSIS — I20.1 ANGINA PECTORIS, VARIANT (HCC): Primary | ICD-10-CM

## 2023-05-17 PROCEDURE — 99214 OFFICE O/P EST MOD 30 MIN: CPT | Performed by: INTERNAL MEDICINE

## 2023-05-17 RX ORDER — NITROGLYCERIN 0.4 MG/1
0.4 TABLET SUBLINGUAL EVERY 5 MIN PRN
Qty: 25 TABLET | Refills: 3 | Status: SHIPPED | OUTPATIENT
Start: 2023-05-17

## 2023-05-17 RX ORDER — ISOSORBIDE MONONITRATE 30 MG/1
TABLET, EXTENDED RELEASE ORAL
Qty: 90 TABLET | Refills: 3 | Status: SHIPPED | OUTPATIENT
Start: 2023-05-17

## 2023-05-17 RX ORDER — METOPROLOL SUCCINATE 25 MG/1
TABLET, EXTENDED RELEASE ORAL
Qty: 90 TABLET | Refills: 3 | Status: SHIPPED | OUTPATIENT
Start: 2023-05-17

## 2023-05-17 NOTE — PATIENT INSTRUCTIONS
Plan:  Labs from 5/10/23 reviewed in [facility] and discussed with patient. Current medications reviewed. Refills given as warranted. Recommend having gastroenterologist evaluate you for bleeding. Follow their recommendations  Continue taking iron tablets  No cardiac testing at this time. You can talk to your arthritis doctor about your pain and see if you there is a different medication. Follow up with me in February, call in December to make your next appointment.

## 2023-05-17 NOTE — PROGRESS NOTES
[address] Office Note  2023   Ref By Nu Green PA-C  Subjective:  Ms. Jacky Flores is being seen for for chest pain. Normal stress echo but chest pain responding to nitro  Imdur greatly reduced the frequency of CP only rarely relieved by SL nitro. She is undergoing testing for esophagus   GB ultasound neg for stones. She has anemia thought to be blood loss from GI tract and iron def  she is on iron replacement. HPI:   Today, she reports she has had a lot of stress and been sick. She does not have any trouble swallowing. She has been having chest pain and she uses Nitro for the pain. She has gained 45 extra pounds of bloating. She eats at home mainly. She eats sandwiches, yogurt, fruit, salad, cereal, oatmeal, microwave meals. Her stove is not working. She lost another uncle to heart problems last summer. She reports she is always anemic and she takes iron. She reports she has been to the GI doctor and had upper and lower scopes for the past 3 years. Patient denies current edema, sob, palpitations, dizziness or syncope. Patient is taking all cardiac medications as prescribed and tolerates them well. Patient is ? vaccinated against Covid. PMH  Ms Jacky Flores 48 y.o. female with PMH GERD, Gastric ulcer, COPD, depression, anxiety, anemia, bipolar  Seen in ER 21 for chest pain MI ruled out and DC home    Review of Systems:         12 point ROS negative in all areas as listed below except as in [place]  Constitutional, EENT,  pulmonary, GI, , Musculoskeletal, skin, neurological, hematological, endocrine, Psychiatric- anxiety     Reviewed past medical history, social, and family history.    Non smoker around 2nd hand smoke, occasional alcohol  Adopted Mom  MI in her sleep age 78 MGF  MI   Past Medical History:   Diagnosis Date    ADHD     Allergic rhinitis     Anemia     Anxiety     Arthritis     Asthma     Bipolar 1 disorder ([facility] [address]     Chicken pox     as a child    COPD (chronic

## 2024-03-26 DIAGNOSIS — I20.1 ANGINA PECTORIS, VARIANT (HCC): ICD-10-CM

## 2024-03-27 NOTE — TELEPHONE ENCOUNTER
Attempted to call pt, no answer. Unable to LMOVM, mailbox was full. Will try again at a later time.

## 2024-03-27 NOTE — TELEPHONE ENCOUNTER
LOV  05/23/2023  UPCOMING  NONE      Will call at appropriate time to schedule appt.      Return in about 9 months (around 2/17/2024).  Plan:  Labs from 5/10/23 reviewed in [facility] and discussed with patient.   Current medications reviewed.  Refills given as warranted.  Recommend having gastroenterologist evaluate you for bleeding.  Follow their recommendations  Continue taking iron tablets  No cardiac testing at this time.  You can talk to your arthritis doctor about your pain and see if you there is a different medication.     Follow up with me in February, call in December to make your next appointment.

## 2024-03-29 RX ORDER — ISOSORBIDE MONONITRATE 30 MG/1
TABLET, EXTENDED RELEASE ORAL
Qty: 30 TABLET | Refills: 0 | Status: SHIPPED | OUTPATIENT
Start: 2024-03-29

## 2024-03-29 NOTE — TELEPHONE ENCOUNTER
Attempted to call pt, no answer. Unable to LMOVM, mailbox was full. This was the third attempt to contact the pt for appt. With no success. Creating letter and mailing to pt's home address at this time.

## 2024-04-01 DIAGNOSIS — I20.1 ANGINA PECTORIS, VARIANT (HCC): ICD-10-CM

## 2024-04-01 RX ORDER — ISOSORBIDE MONONITRATE 30 MG/1
30 TABLET, EXTENDED RELEASE ORAL DAILY
Qty: 30 TABLET | Refills: 0 | Status: SHIPPED | OUTPATIENT
Start: 2024-04-01

## 2024-04-02 ENCOUNTER — TELEPHONE (OUTPATIENT)
Dept: CARDIOLOGY CLINIC | Age: 54
End: 2024-04-02

## 2024-04-02 NOTE — TELEPHONE ENCOUNTER
Pt stated that she has been having chest pain more frequently. She stated that she thinks she is having chest pain more frequently due to being out of the Imdur. Pt was advised that it was refilled on 4/1/24 and that the pharmacy should have it. Pt wants to be seen at [facility] office. Pt was scheduled for next available for 6/3/24.

## 2024-04-05 NOTE — TELEPHONE ENCOUNTER
Attempted to reach pt to see if her symptoms have improved since restarting her Imdur.  Phone went straight to .  Unable to leave a  for pt to return call.      Pt has appt scheduled for June.  If pt symptoms are still bad and she is still wanting to be seen sooner you can add onto Andriy schedule 4/8 or 4/10 at 11:15

## 2024-04-08 NOTE — TELEPHONE ENCOUNTER
Patient states that her chest pain is starting to feel better back on the medication. States she still has it, but very seldom. Offered appointment for 4/10/24, patient declined due to having another appointment. States if it gets worse will call back.

## 2024-06-24 RX ORDER — NITROGLYCERIN 0.4 MG/1
0.4 TABLET SUBLINGUAL EVERY 5 MIN PRN
Qty: 25 TABLET | Refills: 1 | Status: SHIPPED | OUTPATIENT
Start: 2024-06-24

## 2024-06-30 DIAGNOSIS — I20.1 ANGINA PECTORIS, VARIANT (HCC): ICD-10-CM

## 2024-07-02 ENCOUNTER — TELEPHONE (OUTPATIENT)
Dept: CARDIOLOGY CLINIC | Age: 54
End: 2024-07-02

## 2024-07-02 DIAGNOSIS — I20.1 ANGINA PECTORIS, VARIANT: ICD-10-CM

## 2024-07-02 RX ORDER — ISOSORBIDE MONONITRATE 30 MG/1
30 TABLET, EXTENDED RELEASE ORAL DAILY
Qty: 90 TABLET | Refills: 0 | Status: SHIPPED | OUTPATIENT
Start: 2024-07-02

## 2024-07-02 RX ORDER — METOPROLOL SUCCINATE 25 MG/1
TABLET, EXTENDED RELEASE ORAL
Qty: 90 TABLET | Refills: 0 | Status: SHIPPED | OUTPATIENT
Start: 2024-07-02

## 2024-07-02 NOTE — TELEPHONE ENCOUNTER
Pt stated that the pharmacy does not have the refill request for Metorplol Succinate 25mg. Pt also needs refill of Imdur 30mg.

## 2024-07-03 RX ORDER — METOPROLOL SUCCINATE 25 MG/1
25 TABLET, EXTENDED RELEASE ORAL DAILY
Qty: 90 TABLET | Refills: 1 | Status: SHIPPED | OUTPATIENT
Start: 2024-07-03

## 2024-07-03 RX ORDER — ISOSORBIDE MONONITRATE 30 MG/1
30 TABLET, EXTENDED RELEASE ORAL DAILY
Qty: 30 TABLET | Refills: 3 | Status: SHIPPED | OUTPATIENT
Start: 2024-07-03

## 2024-10-03 RX ORDER — NITROGLYCERIN 0.4 MG/1
0.4 TABLET SUBLINGUAL EVERY 5 MIN PRN
Qty: 25 TABLET | Refills: 1 | Status: SHIPPED | OUTPATIENT
Start: 2024-10-03

## 2024-11-30 DIAGNOSIS — I20.1 ANGINA PECTORIS, VARIANT (HCC): ICD-10-CM

## 2024-12-02 RX ORDER — ISOSORBIDE MONONITRATE 30 MG/1
30 TABLET, EXTENDED RELEASE ORAL DAILY
Qty: 90 TABLET | Refills: 0 | OUTPATIENT
Start: 2024-12-02

## 2024-12-02 NOTE — TELEPHONE ENCOUNTER
LOV  05/17/2023  UPCOMING  NONE  BMP  05/10/2023    Attempted to call patient but VM is not set up and no answer.  Will try again later

## 2024-12-03 ENCOUNTER — TELEPHONE (OUTPATIENT)
Dept: CARDIOLOGY CLINIC | Age: 54
End: 2024-12-03

## 2024-12-03 RX ORDER — ISOSORBIDE MONONITRATE 30 MG/1
30 TABLET, EXTENDED RELEASE ORAL DAILY
Qty: 90 TABLET | Refills: 0 | Status: SHIPPED | OUTPATIENT
Start: 2024-12-03

## 2024-12-03 NOTE — TELEPHONE ENCOUNTER
Pt called in to schedule an appt w ROMIE. Pt is now scheduled 12/27 at [facility]. Pt is wondering if she can be seen any sooner due to off and on chest pains at either [place] or [place]. Pt stated when they happen it's sharp pain in her chest that radiates up her arm making her arm tingly and numb. Pt stated when this happens it makes her feel really lightheaded and dizzy like she's going to pass out. Pt stated that these episodes have been persistent for years and are better with medication however she wasn't able to take her medications for the month of November due to [facility] having machine issues and then once that was resolved they had to call it in. Pt stated she needs refills of nitroglycerin as well. Please advise.     [facility]/pharmacy #[id number] - [place], [place] - [address] [phone] - F [phone]  [address] [address]

## 2024-12-03 NOTE — TELEPHONE ENCOUNTER
Attempted to call pt, no answer. Unable to DARLYN CHAVEZ full. Mailing letter to pt's home address.   ROMIE JAMESONOT

## 2024-12-05 RX ORDER — NITROGLYCERIN 0.4 MG/1
0.4 TABLET SUBLINGUAL EVERY 5 MIN PRN
Qty: 25 TABLET | Refills: 0 | Status: SHIPPED | OUTPATIENT
Start: 2024-12-05

## 2024-12-05 NOTE — TELEPHONE ENCOUNTER
Attempted to call patient.  Mailbox is full.      [facility] has no office hours till then that are available.  Patient needs to go to ER if having chest pain.  Nitro sent to pharmacy.

## 2024-12-09 NOTE — TELEPHONE ENCOUNTER
LVM on home machine and pt family member picked up. Pt currently unavailable. Left phone number for pt to return call.

## 2025-01-15 RX ORDER — NITROGLYCERIN 0.4 MG/1
0.4 TABLET SUBLINGUAL EVERY 5 MIN PRN
Qty: 25 TABLET | Refills: 0 | Status: SHIPPED | OUTPATIENT
Start: 2025-01-15

## 2025-01-15 NOTE — TELEPHONE ENCOUNTER
Pt called requesting a refill for   nitroGLYCERIN (NITROSTAT) 0.4 MG   isosorbide mononitrate (IMDUR) 30 MG metoprolol succinate (TOPROL XL) 25 MG     Preferred pharmacy   [facility]/pharmacy #[id number] - [place], [place] - [address] [phone] - F [phone]     Last ov 5/17/23 RKG  Next ov 2/17/25 JAILENE  
Patient states no history

## 2025-01-20 RX ORDER — METOPROLOL SUCCINATE 25 MG/1
25 TABLET, EXTENDED RELEASE ORAL DAILY
Qty: 45 TABLET | Refills: 0 | Status: SHIPPED | OUTPATIENT
Start: 2025-01-20

## 2025-01-20 NOTE — TELEPHONE ENCOUNTER
Last Office Visit: 5/17/2023 Provider: ROMIE  **Is provider OOT? No    Next Office Visit: 2/17/2025 Provider: ROMIE    LAST LABS:   BMP:   Lab Results   Component Value Date/Time     09/17/2024 01:11 PM    K 4.3 09/17/2024 01:11 PM    K 3.9 02/04/2021 11:20 AM     09/17/2024 01:11 PM    CO2 20 09/17/2024 01:11 PM    BUN 11 09/17/2024 01:11 PM    CREATININE 0.72 09/17/2024 01:11 PM    GLUCOSE 88 09/17/2024 01:11 PM    CALCIUM 9.6 09/17/2024 01:11 PM    LABGLOM 101 09/17/2024 01:11 PM

## 2025-01-22 NOTE — TELEPHONE ENCOUNTER
Medication Refill    Medication needing refilled: isosorbide mononitrate (IMDUR) 30 MG extended release tablet [[id number]]        Dosage of the medication: 30    How are you taking this medication (QD, BID, TID, QID, PRN):   Sig: TAKE 1 TABLET BY MOUTH EVERY DAY              30 or 90 day supply called in: 30    When will you run out of your medication:    Which Pharmacy are we sending the medication to?:    [facility]/pharmacy #[id number] - [place], [place] - [address] [phone] - F [phone]  [address] [place] [id number]  Phone: [phone]  Fax: [phone]

## 2025-02-17 ENCOUNTER — OFFICE VISIT (OUTPATIENT)
Dept: CARDIOLOGY CLINIC | Age: 55
End: 2025-02-17
Payer: MEDICARE

## 2025-02-17 VITALS
SYSTOLIC BLOOD PRESSURE: 128 MMHG | DIASTOLIC BLOOD PRESSURE: 72 MMHG | HEART RATE: 91 BPM | OXYGEN SATURATION: 98 % | BODY MASS INDEX: 30.12 KG/M2 | WEIGHT: 153.4 LBS | HEIGHT: 60 IN

## 2025-02-17 DIAGNOSIS — E55.9 VITAMIN D DEFICIENCY: ICD-10-CM

## 2025-02-17 DIAGNOSIS — R42 DIZZINESS AND GIDDINESS: ICD-10-CM

## 2025-02-17 DIAGNOSIS — K21.00 GASTROESOPHAGEAL REFLUX DISEASE WITH ESOPHAGITIS WITHOUT HEMORRHAGE: ICD-10-CM

## 2025-02-17 DIAGNOSIS — D50.0 IRON DEFICIENCY ANEMIA DUE TO CHRONIC BLOOD LOSS: ICD-10-CM

## 2025-02-17 DIAGNOSIS — Z79.899 MEDICATION MANAGEMENT: ICD-10-CM

## 2025-02-17 DIAGNOSIS — R60.0 LOCALIZED EDEMA: ICD-10-CM

## 2025-02-17 DIAGNOSIS — R07.2 PRECORDIAL PAIN: Primary | ICD-10-CM

## 2025-02-17 PROBLEM — D64.9 ANEMIA: Status: RESOLVED | Noted: 2019-10-22 | Resolved: 2025-02-17

## 2025-02-17 PROCEDURE — 93000 ELECTROCARDIOGRAM COMPLETE: CPT | Performed by: INTERNAL MEDICINE

## 2025-02-17 PROCEDURE — 99214 OFFICE O/P EST MOD 30 MIN: CPT | Performed by: INTERNAL MEDICINE

## 2025-02-17 RX ORDER — OMEPRAZOLE 20 MG/1
20 CAPSULE, DELAYED RELEASE ORAL DAILY
Qty: 30 CAPSULE | Refills: 12 | COMMUNITY
Start: 2025-02-17

## 2025-02-17 RX ORDER — OMEPRAZOLE 20 MG/1
20 CAPSULE, DELAYED RELEASE ORAL
Qty: 90 CAPSULE | Refills: 3 | Status: SHIPPED | OUTPATIENT
Start: 2025-02-17

## 2025-02-17 RX ORDER — METOPROLOL SUCCINATE 25 MG/1
25 TABLET, EXTENDED RELEASE ORAL DAILY
Qty: 90 TABLET | Refills: 3 | Status: SHIPPED | OUTPATIENT
Start: 2025-02-17

## 2025-02-17 RX ORDER — ISOSORBIDE MONONITRATE 30 MG/1
30 TABLET, EXTENDED RELEASE ORAL DAILY
Qty: 90 TABLET | Refills: 3 | Status: SHIPPED | OUTPATIENT
Start: 2025-02-17

## 2025-02-17 NOTE — PROGRESS NOTES
TABLET BY MOUTH EVERY DAY 90 tablet 0    [DISCONTINUED] metoprolol succinate (TOPROL XL) 25 MG extended release tablet TAKE 1 TABLET BY MOUTH EVERY DAY 90 tablet 0    [DISCONTINUED] isosorbide mononitrate (IMDUR) 30 MG extended release tablet Take 1 tablet by mouth daily 90 tablet 0    [DISCONTINUED] docusate sodium (COLACE) 100 MG capsule Take 1 capsule by mouth 2 times daily as needed       No facility-administered encounter medications on file as of 2/17/2025.        Lab Data:  CBC: No results for input(s): \"WBC\", \"HGB\", \"HCT\", \"MCV\", \"PLT\" in the last 72 hours.  BMP: No results for input(s): \"NA\", \"K\", \"CL\", \"CO2\", \"PHOS\", \"BUN\", \"CREATININE\" in the last 72 hours.    Invalid input(s): \"CA\"  LIVER PROFILE: No results for input(s): \"AST\", \"ALT\", \"LIPASE\", \"AMYLASE\", \"BILIDIR\", \"BILITOT\", \"ALKPHOS\" in the last 72 hours.    Invalid input(s): \"ALB\"  LIPID: No results found for: \"CHOL\"  No results found for: \"TRIG\"  Lab Results   Component Value Date    HDL 53 11/10/2022    HDL 64 (H) 08/16/2021     No components found for: \"LDLCHOLESTEROL\", \"LDLCALC\"    Lab Results   Component Value Date    VLDL 23 11/10/2022    VLDL 15 08/16/2021     No results found for: \"CHOLHDLRATIO\"  PT/INR: No results for input(s): \"PROTIME\", \"INR\" in the last 72 hours.  A1C: No results found for: \"LABA1C\"  BNP:  No results for input(s): \"BNP\" in the last 72 hours.    IMAGING:   I have reviewed the following tests and documented in this encounter as follows:   Discussed with patient  EKG 2/17/25  SR 90 bpm within normal limits    EKG 11.9.22 NSR low amplitude QRS    Echo stress test 5/28/21  Summary  baseline resting EKG has normal sinus rhythm. EKG is within normal limits.  Patient exercised on treadmill according to standard [place] protocol for 8:43  minutes.  Peak heart rate achieved is 146/min, 85% of predicted maximum.  No cardiac arrhythmia.  No symptoms reported.  No EKG changes of stress induced left ventricular ischemia.  Images

## 2025-02-17 NOTE — PATIENT INSTRUCTIONS
Plan:  Labs reviewed in epic and discussed with patient.   Current medications reviewed.  Refills given as warranted.  I would like for you to take home your after visit medication list from today's visit and compare it with your home medications.  Please call my office at [phone] if any of your medications are different so I can update your list in the computer.   I will personally review your tests and then I will have my staff call you with the results.   I would like for you to have Stress Echo test.  You will need to call [phone] and schedule this test.  -this test will rule out  blockages about 80% accuracy  -you will need to be fasting after midnight  -no caffeine for 24 hours before testing  -make sure you have comfortable shoes and clothing  -make sure you take your blood pressure medication that morning  Start taking prilosec 20 mg daily first thing in the morning.  Do not eat for 30 minutes after you take this medication.   Recommend talking to your pcp about getting a possible sleep referral.  Recommend blood work  -CBC, CMP, TSH, Lipid  -you will need to be fasting for 8 hours before the testing  -it is ok to take your morning medications with sips of water or black coffee    Follow up with me in 6 months.

## 2025-03-19 LAB
BASOPHILS ABSOLUTE: 0 /ΜL
BASOPHILS RELATIVE PERCENT: 1 %
EOSINOPHILS ABSOLUTE: 0.1 /ΜL
EOSINOPHILS RELATIVE PERCENT: 1 %
FERRITIN: 16 NG/ML (ref 9–150)
HCT VFR BLD CALC: 42.8 % (ref 36–46)
HEMOGLOBIN: 13.7 G/DL (ref 12–16)
IRON: 70
LYMPHOCYTES ABSOLUTE: 1.6 /ΜL
LYMPHOCYTES RELATIVE PERCENT: 31 %
MCH RBC QN AUTO: 30.1 PG
MCHC RBC AUTO-ENTMCNC: 32 G/DL
MCV RBC AUTO: 94 FL
MONOCYTES ABSOLUTE: 0.4 /ΜL
MONOCYTES RELATIVE PERCENT: 8 %
NEUTROPHILS ABSOLUTE: 3.1 /ΜL
NEUTROPHILS RELATIVE PERCENT: 59 %
PLATELET # BLD: 282 K/ΜL
PMV BLD AUTO: NORMAL FL
RBC # BLD: 4.55 10^6/ΜL
WBC # BLD: 5.3 10^3/ML

## 2025-04-09 RX ORDER — NITROGLYCERIN 0.4 MG/1
0.4 TABLET SUBLINGUAL EVERY 5 MIN PRN
Qty: 25 TABLET | Refills: 3 | Status: SHIPPED | OUTPATIENT
Start: 2025-04-09

## 2025-04-09 NOTE — TELEPHONE ENCOUNTER
Last Office Visit: 25 Provider: ROMIE  **Is provider OOT? No    Next Office Visit: 9/3/25 Provider: ROMIE  **If no OV, when does pt need to be seen? N/a  **Has patient already had 30 day supply? No    Lab orders needed? no   Encounter provider correct? Yes If not, change provider  Script changes since last refill? no    LAST LABS:   EK25

## 2025-05-09 RX ORDER — METOPROLOL SUCCINATE 25 MG/1
TABLET, EXTENDED RELEASE ORAL
Qty: 90 TABLET | Refills: 1 | Status: SHIPPED | OUTPATIENT
Start: 2025-05-09

## 2025-05-09 RX ORDER — ISOSORBIDE MONONITRATE 30 MG/1
30 TABLET, EXTENDED RELEASE ORAL DAILY
Qty: 90 TABLET | Refills: 1 | Status: SHIPPED | OUTPATIENT
Start: 2025-05-09

## (undated) DEVICE — CANNULA NSL AD TB L7FT ADPT OD6MM ID22MM CLR DIV M LUERLOK

## (undated) DEVICE — Z DISCONTINUED USE 2276105 GOWN PROTCT UNIV CHST W28IN L49IN SL 24IN BLU SPUNBOND FLM

## (undated) DEVICE — CANNULA,OXY,ADULT,SUPERSOFT,W/7'TUB,SC: Brand: MEDLINE INDUSTRIES, INC.

## (undated) DEVICE — ENDOSCOPIC KIT 2 12 FT OP4 DE2 GWN SYR

## (undated) DEVICE — FORCEP BX STD CAP 240CM RAD JAW 4

## (undated) DEVICE — CONMED SCOPE SAVER BITE BLOCK, 20X27 MM: Brand: SCOPE SAVER

## (undated) DEVICE — ELECTRODE ECG MONITR FOAM TEAR DROP ADLT RED

## (undated) DEVICE — KIT INF CTRL 2OZ LUB TBNG L12FT DBL END BRSH SYR OP4

## (undated) DEVICE — YANKAUER,BULB TIP,W/O VENT,RIGID,STERILE: Brand: MEDLINE